# Patient Record
Sex: MALE | Race: WHITE | NOT HISPANIC OR LATINO | Employment: FULL TIME | URBAN - METROPOLITAN AREA
[De-identification: names, ages, dates, MRNs, and addresses within clinical notes are randomized per-mention and may not be internally consistent; named-entity substitution may affect disease eponyms.]

---

## 2021-10-08 PROCEDURE — 96372 THER/PROPH/DIAG INJ SC/IM: CPT

## 2021-10-08 PROCEDURE — 29125 APPL SHORT ARM SPLINT STATIC: CPT | Mod: LT

## 2021-10-08 PROCEDURE — 99284 EMERGENCY DEPT VISIT MOD MDM: CPT | Mod: 25

## 2021-10-09 ENCOUNTER — HOSPITAL ENCOUNTER (EMERGENCY)
Facility: HOSPITAL | Age: 31
Discharge: HOME | End: 2021-10-09
Attending: FAMILY MEDICINE
Payer: MEDICAID

## 2021-10-09 VITALS
RESPIRATION RATE: 18 BRPM | OXYGEN SATURATION: 100 % | WEIGHT: 169.75 LBS | TEMPERATURE: 98 F | DIASTOLIC BLOOD PRESSURE: 79 MMHG | HEART RATE: 86 BPM | SYSTOLIC BLOOD PRESSURE: 151 MMHG

## 2021-10-09 DIAGNOSIS — S63.502A SPRAIN OF LEFT WRIST, INITIAL ENCOUNTER: Primary | ICD-10-CM

## 2021-10-09 DIAGNOSIS — M25.532 LEFT WRIST PAIN: ICD-10-CM

## 2021-10-09 PROCEDURE — 29125 APPL SHORT ARM SPLINT STATIC: CPT | Mod: LT

## 2021-10-09 PROCEDURE — 96372 THER/PROPH/DIAG INJ SC/IM: CPT

## 2021-10-09 PROCEDURE — 63600175 PHARM REV CODE 636 W HCPCS: Performed by: NURSE PRACTITIONER

## 2021-10-09 RX ORDER — IBUPROFEN 800 MG/1
800 TABLET ORAL EVERY 6 HOURS PRN
Qty: 20 TABLET | Refills: 0 | Status: ON HOLD | OUTPATIENT
Start: 2021-10-09 | End: 2022-10-25 | Stop reason: HOSPADM

## 2021-10-09 RX ORDER — HYDROCODONE BITARTRATE AND ACETAMINOPHEN 7.5; 325 MG/1; MG/1
1 TABLET ORAL EVERY 6 HOURS PRN
Qty: 12 TABLET | Refills: 0 | Status: SHIPPED | OUTPATIENT
Start: 2021-10-09 | End: 2021-10-14

## 2021-10-09 RX ORDER — KETOROLAC TROMETHAMINE 30 MG/ML
30 INJECTION, SOLUTION INTRAMUSCULAR; INTRAVENOUS
Status: COMPLETED | OUTPATIENT
Start: 2021-10-09 | End: 2021-10-09

## 2021-10-09 RX ADMIN — KETOROLAC TROMETHAMINE 30 MG: 30 INJECTION, SOLUTION INTRAMUSCULAR at 12:10

## 2021-10-11 ENCOUNTER — TELEPHONE (OUTPATIENT)
Dept: ORTHOPEDICS | Facility: CLINIC | Age: 31
End: 2021-10-11

## 2022-09-10 ENCOUNTER — HOSPITAL ENCOUNTER (EMERGENCY)
Facility: HOSPITAL | Age: 32
Discharge: HOME | End: 2022-09-10
Attending: EMERGENCY MEDICINE
Payer: MEDICAID

## 2022-09-10 VITALS
DIASTOLIC BLOOD PRESSURE: 74 MMHG | HEART RATE: 101 BPM | BODY MASS INDEX: 29.17 KG/M2 | SYSTOLIC BLOOD PRESSURE: 138 MMHG | WEIGHT: 192.44 LBS | RESPIRATION RATE: 20 BRPM | OXYGEN SATURATION: 99 % | TEMPERATURE: 99 F | HEIGHT: 68 IN

## 2022-09-10 DIAGNOSIS — Z76.89 ENCOUNTER FOR MEDICATION ADMINISTRATION: Primary | ICD-10-CM

## 2022-09-10 PROCEDURE — 99283 EMERGENCY DEPT VISIT LOW MDM: CPT

## 2022-09-10 PROCEDURE — 25000003 PHARM REV CODE 250: Performed by: NURSE PRACTITIONER

## 2022-09-10 RX ORDER — METHADONE HYDROCHLORIDE 10 MG/1
80 TABLET ORAL ONCE
Status: COMPLETED | OUTPATIENT
Start: 2022-09-10 | End: 2022-09-10

## 2022-09-10 RX ADMIN — METHADONE HYDROCHLORIDE 80 MG: 10 TABLET ORAL at 06:09

## 2022-09-10 NOTE — ED PROVIDER NOTES
HISTORY     Chief Complaint   Patient presents with    Medication Refill     Needs methadone 80 mg, out of meds and clinic closed before he got there.     Review of patient's allergies indicates:   Allergen Reactions    Sulfa (sulfonamide antibiotics)         HPI   32-year-old male presents the emergency department for medication administration.  Patient reports he takes 80 mg of methadone daily did not make it in time for his appointment today and is seeking dosing here in the ER for his daily dose.  Patient denies any fever, chills, chest pain, back pain, abdominal pain, shortness of breath, nausea, vomiting, and all other concerns at this time.    The history is provided by the patient. No  was used.      PCP: Kristopher Lamas MD     Past Medical History:  No past medical history on file.     Past Surgical History:  No past surgical history on file.     Family History:  No family history on file.     Social History:  Social History     Tobacco Use    Smoking status: Not on file    Smokeless tobacco: Not on file   Substance and Sexual Activity    Alcohol use: Not on file    Drug use: Not on file    Sexual activity: Not on file         ROS   Review of Systems   Constitutional:  Negative for fever.   HENT:  Negative for sore throat.    Respiratory:  Negative for shortness of breath.    Cardiovascular:  Negative for chest pain.   Gastrointestinal:  Negative for abdominal pain and nausea.   Genitourinary:  Negative for dysuria.   Musculoskeletal:  Negative for back pain.   Skin:  Negative for rash.   Neurological:  Negative for weakness.   Hematological:  Does not bruise/bleed easily.     PHYSICAL EXAM     Initial Vitals [09/10/22 1737]   BP Pulse Resp Temp SpO2   (!) 146/77 (!) 113 20 98.8 °F (37.1 °C) 98 %      MAP       --           Physical Exam    Nursing note and vitals reviewed.  Constitutional: He appears well-developed and well-nourished. He is not diaphoretic. No distress.   HENT:  "  Head: Normocephalic and atraumatic.   Eyes: Right eye exhibits no discharge. Left eye exhibits no discharge.   Neck: Neck supple.   Normal range of motion.  Cardiovascular:            Tachycardic   Pulmonary/Chest: No respiratory distress.   Abdominal: Abdomen is soft. He exhibits no distension. There is no abdominal tenderness.   Musculoskeletal:         General: Normal range of motion.      Cervical back: Normal range of motion and neck supple.     Neurological: He is alert and oriented to person, place, and time. He has normal strength.   Skin: Skin is warm and dry.   Psychiatric: He has a normal mood and affect. His behavior is normal. Thought content normal.        ED COURSE   Procedures  ED ONGOING VITALS:  Vitals:    09/10/22 1737   BP: (!) 146/77   Pulse: (!) 113   Resp: 20   Temp: 98.8 °F (37.1 °C)   TempSrc: Oral   SpO2: 98%   Weight: 87.3 kg (192 lb 7.4 oz)   Height: 5' 8" (1.727 m)         ABNORMAL LAB VALUES:  Labs Reviewed - No data to display      ALL LAB VALUES:  none      RADIOLOGY STUDIES:  Imaging Results    None                   The above vital signs and test results have been reviewed by the emergency provider.     ED Medications:  Medications   methadone tablet 80 mg (has no administration in time range)       Current Discharge Medication List        Discharge Medications:  New Prescriptions    No medications on file       Follow-up Information       pcp of choice.    Why: As needed             [facility] - Emergency Dept..    Specialty: Emergency Medicine  Why: If symptoms worsen  Contact information:  [address] [facility] [address] [phone]                          I discussed with patient and/or family/caretaker that evaluation in the ED does not suggest any emergent or life threatening medical conditions requiring immediate intervention beyond what was provided in the ED, and I believe patient is safe for discharge. Regardless, an unremarkable evaluation in " the ED does not preclude the development or presence of a serious or life threatening condition. As such, patient was instructed to return immediately for any worsening or change in current symptoms.        MEDICAL DECISION MAKING   Medical Decision Making:   Emergency Methadone Documentation    Patient Current Dose: 80 mg  Clinic Name: [facility]  Clinic Phone Number: [phone]  Clinic Contact Name: non contact name. Address is [address] [place] [address]  Patient Specific number if applicable: [phone]  Date of last dose: 09/09/2022                 CLINICAL IMPRESSION       ICD-10-CM ICD-9-CM   1. Encounter for medication administration  Z76.89 V58.69       Disposition:   Disposition: Discharged  Condition: Stable       Javier Snider NP  09/10/22 3447

## 2022-09-11 ENCOUNTER — HOSPITAL ENCOUNTER (EMERGENCY)
Facility: HOSPITAL | Age: 32
Discharge: HOME | End: 2022-09-11
Attending: EMERGENCY MEDICINE
Payer: MEDICAID

## 2022-09-11 VITALS
OXYGEN SATURATION: 98 % | HEART RATE: 82 BPM | DIASTOLIC BLOOD PRESSURE: 73 MMHG | TEMPERATURE: 98 F | SYSTOLIC BLOOD PRESSURE: 132 MMHG | RESPIRATION RATE: 18 BRPM

## 2022-09-11 DIAGNOSIS — F11.20 ENCOUNTER FOR METHADONE MAINTENANCE THERAPY: Primary | ICD-10-CM

## 2022-09-11 DIAGNOSIS — Z76.89 ENCOUNTER FOR MEDICATION ADMINISTRATION: ICD-10-CM

## 2022-09-11 PROCEDURE — 99283 EMERGENCY DEPT VISIT LOW MDM: CPT | Mod: 25

## 2022-09-11 PROCEDURE — 25000003 PHARM REV CODE 250: Performed by: NURSE PRACTITIONER

## 2022-09-11 RX ORDER — METHADONE HYDROCHLORIDE 10 MG/1
80 TABLET ORAL
Status: COMPLETED | OUTPATIENT
Start: 2022-09-11 | End: 2022-09-11

## 2022-09-11 RX ADMIN — METHADONE HYDROCHLORIDE 80 MG: 10 TABLET ORAL at 11:09

## 2022-09-13 NOTE — ED PROVIDER NOTES
Encounter Date: 9/11/2022       History     Chief Complaint   Patient presents with    Medication Refill     Pt is here for a methadone refill. His normal clinic closed before he could make it today.      32-year-old male presents to the ER for a methadone dose.  Methadone clinic is not open on Sunday.  Patient usually gets his methadone for Sunday on Saturday but he missed his appointment on Saturday.    The history is provided by the patient.   Review of patient's allergies indicates:   Allergen Reactions    Sulfa (sulfonamide antibiotics)      No past medical history on file.  No past surgical history on file.  No family history on file.     Review of Systems   Constitutional:  Negative for fever.        Methadone administration   HENT:  Negative for sore throat.    Respiratory:  Negative for shortness of breath.    Cardiovascular:  Negative for chest pain.   Gastrointestinal:  Negative for nausea.   Genitourinary:  Negative for dysuria.   Musculoskeletal:  Negative for back pain.   Skin:  Negative for rash.   Neurological:  Negative for weakness.   Hematological:  Does not bruise/bleed easily.     Physical Exam     Initial Vitals [09/11/22 2253]   BP Pulse Resp Temp SpO2   132/73 82 18 98.1 °F (36.7 °C) 98 %      MAP       --         Physical Exam    Constitutional: He appears well-developed and well-nourished. No distress.   HENT:   Head: Normocephalic and atraumatic.   Eyes: Conjunctivae are normal. Pupils are equal, round, and reactive to light.   Neck: Neck supple.   Normal range of motion.  Cardiovascular:  Normal rate, regular rhythm and normal heart sounds.           Pulmonary/Chest: Breath sounds normal.   Abdominal: Abdomen is soft. Bowel sounds are normal.   Musculoskeletal:         General: Normal range of motion.      Cervical back: Normal range of motion and neck supple.     Neurological: He is alert and oriented to person, place, and time. No cranial nerve deficit.   Skin: Skin is warm and dry.    Psychiatric: He has a normal mood and affect.       ED Course   Procedures  Labs Reviewed - No data to display       Imaging Results    None          Medications   methadone tablet 80 mg (80 mg Oral Given 9/11/22 7180)                              Clinical Impression:   Final diagnoses:  [F11.20] Encounter for methadone maintenance therapy (Primary)  [Z76.89] Encounter for medication administration        ED Disposition Condition    Discharge Stable          ED Prescriptions    None       Follow-up Information       Follow up With Specialties Details Why Contact Info    Kristopher Lamas MD Internal Medicine   [address] [place] [id number] [phone]      [facility] - Emergency Dept. Emergency Medicine   [address] [facility] [address] [phone]             Jasson Garcia NP  09/13/22 0147

## 2022-10-15 ENCOUNTER — HOSPITAL ENCOUNTER (EMERGENCY)
Facility: HOSPITAL | Age: 32
Discharge: HOME | End: 2022-10-15
Attending: EMERGENCY MEDICINE
Payer: MEDICAID

## 2022-10-15 VITALS
RESPIRATION RATE: 20 BRPM | SYSTOLIC BLOOD PRESSURE: 144 MMHG | OXYGEN SATURATION: 100 % | HEART RATE: 92 BPM | TEMPERATURE: 99 F | DIASTOLIC BLOOD PRESSURE: 76 MMHG

## 2022-10-15 DIAGNOSIS — S99.929A FOOT INJURY: ICD-10-CM

## 2022-10-15 DIAGNOSIS — S82.892A CLOSED FRACTURE OF LEFT ANKLE, INITIAL ENCOUNTER: Primary | ICD-10-CM

## 2022-10-15 DIAGNOSIS — M79.89 LEG SWELLING: ICD-10-CM

## 2022-10-15 DIAGNOSIS — S99.919A ANKLE INJURY: ICD-10-CM

## 2022-10-15 DIAGNOSIS — S82.852A LEFT TRIMALLEOLAR FRACTURE, CLOSED, INITIAL ENCOUNTER: Primary | ICD-10-CM

## 2022-10-15 PROCEDURE — 99283 EMERGENCY DEPT VISIT LOW MDM: CPT | Mod: 57,,, | Performed by: ORTHOPAEDIC SURGERY

## 2022-10-15 PROCEDURE — 27818 ORTHOPEDIC INJURY TREATMENT: ICD-10-PCS | Mod: LT,,, | Performed by: ORTHOPAEDIC SURGERY

## 2022-10-15 PROCEDURE — 29515 APPLICATION SHORT LEG SPLINT: CPT | Mod: LT

## 2022-10-15 PROCEDURE — 99283 EMERGENCY DEPT VISIT LOW MDM: CPT | Mod: 25

## 2022-10-15 PROCEDURE — 99283 PR EMERGENCY DEPT VISIT,LEVEL III: ICD-10-PCS | Mod: 57,,, | Performed by: ORTHOPAEDIC SURGERY

## 2022-10-15 PROCEDURE — 27818 TREATMENT OF ANKLE FRACTURE: CPT | Mod: LT,,, | Performed by: ORTHOPAEDIC SURGERY

## 2022-10-15 RX ORDER — HYDROCODONE BITARTRATE AND ACETAMINOPHEN 10; 325 MG/1; MG/1
1 TABLET ORAL EVERY 4 HOURS PRN
Qty: 10 TABLET | Refills: 0 | Status: ON HOLD | OUTPATIENT
Start: 2022-10-15 | End: 2022-10-25 | Stop reason: HOSPADM

## 2022-10-16 PROBLEM — S82.892A CLOSED FRACTURE OF LEFT ANKLE: Status: ACTIVE | Noted: 2022-10-16

## 2022-10-16 NOTE — ED PROVIDER NOTES
Encounter Date: 10/15/2022       History     Chief Complaint   Patient presents with    Foot Injury     Pt states he fell awkwardly at his moms house on a wet floor and thinks his left foot is broken. Pt foot is purple and extremely bruised in triage. Pt is on crutches in triage.      Patient complains of left ankle pain states that last night around midnight he fell awkwardly twisting his left ankle.    The history is provided by the patient.   Foot Injury   The incident occurred at home. The injury mechanism was a fall. The incident occurred today. He has tried nothing for the symptoms.   Review of patient's allergies indicates:   Allergen Reactions    Sulfa (sulfonamide antibiotics)      No past medical history on file.  No past surgical history on file.  No family history on file.     Review of Systems   Constitutional:  Negative for fever.   HENT:  Negative for sore throat.    Respiratory:  Negative for shortness of breath.    Cardiovascular:  Negative for chest pain.   Gastrointestinal:  Negative for nausea.   Genitourinary:  Negative for dysuria.   Musculoskeletal:  Positive for arthralgias and joint swelling. Negative for back pain.   Skin:  Negative for rash.   Neurological:  Negative for weakness.   Hematological:  Does not bruise/bleed easily.     Physical Exam     Initial Vitals [10/15/22 1911]   BP Pulse Resp Temp SpO2   (!) 160/78 98 19 98.9 °F (37.2 °C) 98 %      MAP       --         Physical Exam    Nursing note and vitals reviewed.  Constitutional: He appears well-developed and well-nourished.   HENT:   Head: Normocephalic and atraumatic.   Eyes: Conjunctivae are normal. Pupils are equal, round, and reactive to light.   Neck: Neck supple.   Normal range of motion.  Cardiovascular:  Normal rate, regular rhythm, normal heart sounds and intact distal pulses.           Pulmonary/Chest: Breath sounds normal.   Abdominal: Abdomen is soft. There is no rebound and no guarding.   Musculoskeletal:          General: Normal range of motion.      Cervical back: Normal range of motion and neck supple.      Comments: Significant left ankle ecchymosis with swelling TTP with range of motion and medial malleolar to bulla lesions     Neurological: He is alert.   Skin: Skin is warm and dry.   Psychiatric: He has a normal mood and affect. His behavior is normal. Thought content normal.       ED Course   Procedures  Labs Reviewed - No data to display       Imaging Results               CT Ankle (Including Hindfoot) Without Contrast Left (Final result)  Result time 10/15/22 22:34:03      Final result by Connor Funez MD (10/15/22 22:34:03)                   Impression:      Trimalleolar fracture as above.    This report was flagged in Epic as abnormal.    All CT scans at this facility are performed  using dose modulation techniques as appropriate to performed exam including the following:  automated exposure control; adjustment of mA and/or kV according to the patients size (this includes techniques or standardized protocols for targeted exams where dose is matched to indication/reason for exam: i.e. extremities or head);  iterative reconstruction technique.      Electronically signed by: Connor Funez  Date:    10/15/2022  Time:    22:34               Narrative:    EXAMINATION:  CT ANKLE (INCLUDING HINDFOOT) WITHOUT CONTRAST LEFT    CLINICAL HISTORY:  Fracture, ankle;Orthopedic request for surgery planning;    TECHNIQUE:  Low-dose axial noncontrast images of the ankle were obtained.  Post processing with multiplanar reformats.    COMPARISON:  None    FINDINGS:  Acute displaced medial malleolus fracture.  Minimal angulation.    Obliquely oriented fibular fracture which is mildly displaced and mildly angulated.    Additional posterior tibial fracture, minimally displaced and non angulated.    Associated soft tissue swelling throughout the visualized lower extremity likely posttraumatic.    Type 2 os navicular.  No osseous  destructive process.                                        X-Ray Tibia Fibula 2 View Left (Final result)  Result time 10/15/22 22:18:15      Final result by Connor Funez MD (10/15/22 22:18:15)                   Impression:      As above.    This report was flagged in Epic as abnormal.      Electronically signed by: Connor Funez  Date:    10/15/2022  Time:    22:18               Narrative:    EXAMINATION:  XR TIBIA FIBULA 2 VIEW LEFT    CLINICAL HISTORY:  Other specified soft tissue disorders    TECHNIQUE:  AP and lateral views of the left tibia and fibula were performed.    COMPARISON:  None.    FINDINGS:  Soft tissue swelling possibly posttraumatic.    Redemonstration of trimalleolar fracture.                                       X-Ray Foot Complete Left (Final result)  Result time 10/15/22 22:19:11      Final result by Connor Funez MD (10/15/22 22:19:11)                   Impression:      As above.      Electronically signed by: Connor Funez  Date:    10/15/2022  Time:    22:19               Narrative:    EXAMINATION:  XR FOOT COMPLETE 3 VIEW LEFT    CLINICAL HISTORY:  .  Unspecified injury of unspecified foot, initial encounter    TECHNIQUE:  AP, lateral and oblique views of the left foot were performed.    COMPARISON:  None    FINDINGS:  Redemonstration of trimalleolar fracture.  Fracture alignment appears similar to the prior.    No additional fracture of the foot identified.  Type 2 os navicular.                                       X-Ray Ankle Complete Left (Final result)  Result time 10/15/22 19:50:01      Final result by Connor Funez MD (10/15/22 19:50:01)                   Impression:      Trimalleolar fracture as above with associated soft tissue swelling.      Electronically signed by: Connor Funez  Date:    10/15/2022  Time:    19:50               Narrative:    EXAMINATION:  XR ANKLE COMPLETE 3 VIEW LEFT    CLINICAL HISTORY:  Unspecified injury of unspecified ankle, initial  encounter.  No additional clinical history available the time of dictation.    TECHNIQUE:  AP, lateral and oblique views of the left ankle were performed.    COMPARISON:  None    FINDINGS:  Acute mildly displaced medial malleolus fracture with associated soft tissue swelling.  Obliquely oriented mildly displaced fibular fracture.  Posterior tibial fracture also noted, mildly displaced.                                       Medications - No data to display  Medical Decision Making:   Orthopedic on-call Dr. Cano in the ED to reduce and splint the patient              10:00 PM:  Assumed care patient.  Currently awaiting CT imaging for further evaluation of trimalleolar fracture.  Patient currently resting and pain controlled at this time.     10:46 PM: Dr. Cano reviewed CT images of ankle and states that patient is fine to be discharged.  Patient will likely have surgery on October 25th or October 28th at [facility].         Clinical Impression:   Final diagnoses:  [S99.919A] Ankle injury  [S99.929A] Foot injury  [M79.89] Leg swelling  [S82.892A] Closed fracture of left ankle, initial encounter (Primary)        ED Disposition Condition    Discharge Stable          ED Prescriptions       Medication Sig Dispense Start Date End Date Auth. Provider    HYDROcodone-acetaminophen (NORCO)  mg per tablet Take 1 tablet by mouth every 4 (four) hours as needed for Pain. 10 tablet 10/15/2022 -- Demetrius Zhang NP          Follow-up Information       Follow up With Specialties Details Why Contact Info    [facility]    [address] [place] [address] [address]  [place] [address]  [phone]               Michael Fisher Jr., FNP  10/16/22 0217

## 2022-10-16 NOTE — PROCEDURES
Hudson Alex is a 32 y.o. male patient.    Temp: 98.9 °F (37.2 °C) (10/15/22 2302)  Pulse: 92 (10/15/22 2302)  Resp: 20 (10/15/22 2302)  BP: (!) 144/76 (10/15/22 2302)  SpO2: 100 % (10/15/22 2302)       Orthopedic Injury Treatment    Date/Time: 10/15/2022 10:00 PM  Performed by: Hernando Cano MD  Authorized by: Hernando Cano MD     Location procedure was performed:  [facility]  Pre-operative diagnosis:  Left trimalleolar ankle fracture  Post-operative diagnosis:  Left trimalleolar ankle fracture  Consent Done?:  Not Needed  Injury:     Injury location:  Ankle    Injury type:  Fracture    Fracture type: trimalleolar        Pre-procedure assessment:     Neurovascular status: Neurovascularly intact      Distal perfusion: normal      Neurological function: normal      Range of motion: reduced      Local anesthesia used?: No      Patient sedated?: No        Procedure details:     Description of findings:  Trimal fx  Selections made in this section will also lock the Injury type section above.:     Manipulation performed?: Yes      Skin traction used?: No      Skeletal traction used?: No      Reduction successful?: Yes      Confirmation: Reduction confirmed by x-ray      Immobilization:  Splint    Supplies used:  Ortho-Glass    Technical Procedures Used:  3 point molded splint    Complications: No      Estimated blood loss (mL):  0    Specimens: No      Implants: No    Post-procedure assessment:     Neurovascular status: Neurovascularly intact      Distal perfusion: normal      Neurological function: normal      Range of motion: splinted      Patient tolerance:  Patient tolerated the procedure well with no immediate complications    10/16/2022

## 2022-10-16 NOTE — CONSULTS
CC:  Left ankle fracture      HISTORY       HPI:  32-year-old male fall down 1 step 10/14/2022  Immediate ankle pain, inability bear weight.  Following the injury he had to drive himself to the emergency department, but left an outside hospital facility due to prolonged wait, then came to our facility.  He presented our facility, it was noted to have significant left ankle and foot swelling, with fracture blisters around his ankle.  X-rays indicated left trimalleolar ankle fracture.    Lives at home with mother   Works construction   Chews tobacco   Denies history of heart attack, stroke, blood clot, cancer, diabetes    At the time my evaluation patient isolated pain left ankle, 7/10, sharp, stabbing.  No numbness no tingling    ROS:  Constitutional: Denies fever/chills  Neurological: Denies numbness/tingling (any exceptions noted in orthopaedic exam)   Psychiatric/Behavioral: Denies change in normal mood  Eyes: Denies change in vision  Cardiovascular: Denies chest pain  Respiratory: Denies shortness of breath  Hematologic/Lymphatic: Denies easy bleeding/bruising   Skin: Denies new rash or skin lesions   Gastrointestinal: Denies nausea/vomitting/diarrhea, change in bowel habits, abdominal pain   Allergic/Immunologic: Denies adverse reactions to current medications  Musculoskeletal: see HPI    PAST MEDICAL HISTORY: No past medical history on file.  PAST SURGICAL HISTORY: No past surgical history on file.  FAMILY HISTORY: No family history on file.  SOCIAL HISTORY:   Social History     Socioeconomic History    Marital status: Single     MEDICATIONS: No current facility-administered medications for this encounter.    Current Outpatient Medications:     HYDROcodone-acetaminophen (NORCO)  mg per tablet, Take 1 tablet by mouth every 4 (four) hours as needed for Pain., Disp: 10 tablet, Rfl: 0    ibuprofen (ADVIL,MOTRIN) 800 MG tablet, Take 1 tablet (800 mg total) by mouth every 6 (six) hours as needed for Pain.,  Disp: 20 tablet, Rfl: 0  ALLERGIES:   Review of patient's allergies indicates:   Allergen Reactions    Sulfa (sulfonamide antibiotics)          EXAM      VITAL SIGNS:   BP (!) 160/78 (BP Location: Right arm, Patient Position: Sitting)   Pulse 98   Temp 98.9 °F (37.2 °C) (Oral)   Resp 19   SpO2 98%       PE:  General:  no acute distress, appears stated age    Neuro: alert and oriented x3  Psych: normal mood  Head: normocephalic, atraumatic.   Eyes: no scleral icterus  Mouth: moist mucous membranes  Cardiovascular: extremities warm and well perfused  Lungs: breathing comfortably, equal chest rise bilat  Skin: clean, dry, intact (any exceptions noted in below musculoskeletal exam)    Musculoskeletal:  RLE:  No gross deformities, wounds  No crepitus, No TTP  Motor intact hip flex, quad, Tib Ant, gastroc, EHL, FHL.  Sensation intact saphenous, sural, deep/superficial peroneal, tibial nerves  Palp DP/PT pulse, BCR    LLE:  Significant swelling about ankle   Blood-filled fracture blisters medially   Tender to palpation circumferentially about ankle  No proximal leg or knee tenderness  No hip pain  Motor intact hip flex, quad, Tib Ant, gastroc, EHL, FHL.  Sensation intact saphenous, sural, deep/superficial peroneal, tibial nerves  Palp DP/PT pulse, BCR    XRAYS:  X-ray and CT scan left ankle demonstrate a trimalleolar ankle fracture mild displacement  (I independently reviewed and interpreted the above imaging)    MEDICAL DECISION MAKING       32M  Fall 10.14.22  L trimal ankle fx    Significant soft tissue swelling and blood filled fx blisters    Molded splint applied LLE  Discussed injury  Discussed nonop vs ORIF    F/u in clinic NPO  after MN 10.25 in AM (JUAN Tirado) for removal of splint, skin check, possible surgery that day if soft tissue of amenable - however will likely require further soft tissue rest     Rec ORIF when soft tissue swelling allows (10.25 or 10.28 @  [facility])    =====================  Hernando Cano MD  Orthopaedic Surgery

## 2022-10-17 ENCOUNTER — HOSPITAL ENCOUNTER (EMERGENCY)
Facility: HOSPITAL | Age: 32
Discharge: HOME | End: 2022-10-17
Attending: EMERGENCY MEDICINE
Payer: MEDICAID

## 2022-10-17 VITALS
HEART RATE: 110 BPM | WEIGHT: 206.81 LBS | RESPIRATION RATE: 20 BRPM | DIASTOLIC BLOOD PRESSURE: 80 MMHG | HEIGHT: 68 IN | SYSTOLIC BLOOD PRESSURE: 144 MMHG | BODY MASS INDEX: 31.34 KG/M2 | OXYGEN SATURATION: 100 % | TEMPERATURE: 99 F

## 2022-10-17 DIAGNOSIS — S82.892D CLOSED FRACTURE OF LEFT ANKLE WITH ROUTINE HEALING, SUBSEQUENT ENCOUNTER: ICD-10-CM

## 2022-10-17 DIAGNOSIS — Z47.89 AFTERCARE FOR CAST OR SPLINT CHECK OR CHANGE: Primary | ICD-10-CM

## 2022-10-17 PROCEDURE — 99283 EMERGENCY DEPT VISIT LOW MDM: CPT | Mod: 25

## 2022-10-17 PROCEDURE — 29515 APPLICATION SHORT LEG SPLINT: CPT

## 2022-10-18 NOTE — ED NOTES
Bed: Dispo 2  Expected date:   Expected time:   Means of arrival:   Comments:  Isai Dunn NP  10/17/22 2127

## 2022-10-18 NOTE — ED PROVIDER NOTES
Encounter Date: 10/17/2022       History     Chief Complaint   Patient presents with    Foot Pain      Pt reports that he was seen in ED yesterday and was diagnosed with 3 broken bones in left foot. Pt reports that his splint popped open and now he is experiencing bleeding and needs his foot re wrapped with a splint.      Patient presents to ER for splint re-application.  Patient was evaluated 2 days ago after experiencing left foot injury and diagnosed with a trimalleolar fracture and placed in a posterior and stirrup splint to left lower extremity.  Patient states he was walking with his crutches and a nail sticking out of the ground caught the bottom of his splint tearing the splint.  He states the splint has been progressively ripping ever since.  He states the nail did not make contact with his skin or cause puncture wound.  Patient is here in ER to have splint replaced.  He has no further concerns at this time.  He denies numbness, tingling, fever.    The history is provided by the patient.   Review of patient's allergies indicates:   Allergen Reactions    Sulfa (sulfonamide antibiotics)      No past medical history on file.  No past surgical history on file.  No family history on file.     Review of Systems   Constitutional:  Negative for chills, fatigue and fever.   HENT:  Negative for ear pain and sore throat.    Eyes:  Negative for pain.   Respiratory:  Negative for cough and shortness of breath.    Cardiovascular:  Negative for chest pain and palpitations.   Gastrointestinal:  Negative for abdominal pain, nausea and vomiting.   Genitourinary:  Negative for dysuria.   Musculoskeletal:  Negative for back pain, myalgias and neck pain.        + splint to LLE   Skin:  Negative for rash.   Neurological:  Negative for weakness, numbness and headaches.     Physical Exam     Initial Vitals [10/17/22 2041]   BP Pulse Resp Temp SpO2   (!) 156/90 (!) 116 20 98.2 °F (36.8 °C) 98 %      MAP       --         Physical  Exam    Nursing note and vitals reviewed.  Constitutional: He appears well-developed and well-nourished. He is not diaphoretic. No distress.   HENT:   Head: Normocephalic and atraumatic.   Eyes: EOM are normal. Pupils are equal, round, and reactive to light.   Neck: Neck supple.   Normal range of motion.  Cardiovascular:  Regular rhythm and intact distal pulses.           + tachycardia   Pulmonary/Chest: Breath sounds normal. No respiratory distress.   Musculoskeletal:         General: Normal range of motion.      Cervical back: Normal range of motion and neck supple.     Neurological: He is alert and oriented to person, place, and time. He has normal strength. No sensory deficit. GCS score is 15. GCS eye subscore is 4. GCS verbal subscore is 5. GCS motor subscore is 6.   Skin: Skin is warm and dry. Capillary refill takes less than 2 seconds.   + after fully removing posterior and stirrup splint to left lower extremity, ecchymosis to left foot and left ankle noted.  There is blistering noted to left ankle.  Distal sensation is intact.  Neurovascularly intact.       ED Course   Splint Application    Date/Time: 10/17/2022 9:26 PM  Performed by: Rafat Dunn NP  Authorized by: Sachin Parmar Jr., MD   Location details: left ankle  Splint type: short leg (Short-leg posterior and stirrup splint)  Supplies used: Ortho-Glass  Post-procedure: The splinted body part was neurovascularly unchanged following the procedure.  Patient tolerance: Patient tolerated the procedure well with no immediate complications  Comments: Patient remains neurovascularly intact.      Labs Reviewed - No data to display       Imaging Results    None          Medications - No data to display               Patient remains neurovascularly intact post splint application.  Patient with no further concerns.  Patient states he does have orthopedic follow-up, encouraged patient to continue ortho follow-up as scheduled.  Discussed signs and symptoms to  return to ER.  Patient agrees with plan states comfortable discharge home.    I discussed with patient  that evaluation in the ED does not suggest any emergent or life threatening medical conditions requiring immediate intervention beyond what was provided in the ED, and I believe patient is safe for discharge. Regardless, an unremarkable evaluation in the ED does not preclude the development or presence of a serious of life threatening condition. As such, patient was instructed to return immediately for any worsening or change in current symptoms.              Clinical Impression:   Final diagnoses:  [S82.442D] Closed fracture of left ankle with routine healing, subsequent encounter  [Z47.89] Aftercare for cast or splint check or change (Primary)      ED Disposition Condition    Discharge Stable          ED Prescriptions    None       Follow-up Information       Follow up With Specialties Details Why Contact Info    [facility]  In 1 day  [address]  [facility] [address]  [phone]      [facility] Dept. Emergency Medicine  As needed, If symptoms worsen [address] [facility] [address] [phone]             Rafat Dunn NP  10/17/22 0561

## 2022-10-22 ENCOUNTER — HOSPITAL ENCOUNTER (EMERGENCY)
Facility: HOSPITAL | Age: 32
Discharge: HOME | End: 2022-10-22
Attending: EMERGENCY MEDICINE
Payer: MEDICAID

## 2022-10-22 ENCOUNTER — NURSE TRIAGE (OUTPATIENT)
Dept: ADMINISTRATIVE | Facility: CLINIC | Age: 32
End: 2022-10-22
Payer: MEDICAID

## 2022-10-22 VITALS
SYSTOLIC BLOOD PRESSURE: 149 MMHG | TEMPERATURE: 98 F | OXYGEN SATURATION: 99 % | BODY MASS INDEX: 29.9 KG/M2 | HEART RATE: 102 BPM | HEIGHT: 68 IN | DIASTOLIC BLOOD PRESSURE: 77 MMHG | RESPIRATION RATE: 20 BRPM | WEIGHT: 197.31 LBS

## 2022-10-22 DIAGNOSIS — Z47.89 AFTERCARE FOR CAST OR SPLINT CHECK OR CHANGE: Primary | ICD-10-CM

## 2022-10-22 PROCEDURE — 99283 EMERGENCY DEPT VISIT LOW MDM: CPT | Mod: 25,27

## 2022-10-22 PROCEDURE — 25000003 PHARM REV CODE 250: Performed by: NURSE PRACTITIONER

## 2022-10-22 PROCEDURE — 29515 APPLICATION SHORT LEG SPLINT: CPT | Mod: LT

## 2022-10-22 RX ORDER — MUPIROCIN 20 MG/G
1 OINTMENT TOPICAL
Status: COMPLETED | OUTPATIENT
Start: 2022-10-22 | End: 2022-10-22

## 2022-10-22 RX ORDER — ONDANSETRON 4 MG/1
4 TABLET, ORALLY DISINTEGRATING ORAL EVERY 6 HOURS PRN
Qty: 20 TABLET | Refills: 0 | Status: SHIPPED | OUTPATIENT
Start: 2022-10-22

## 2022-10-22 RX ADMIN — MUPIROCIN 22 G: 20 OINTMENT TOPICAL at 04:10

## 2022-10-22 NOTE — ED PROVIDER NOTES
Encounter Date: 10/22/2022       History     Chief Complaint   Patient presents with    needs new splint     States he has fx to left ankle and had splint placed. Splint came loose and was removed, had another splint placed but it got wet. Now needs a 3rd splint.     Pt presents for reapplication of splint for left ankle fracture.  Pt was seen in the ER last week for initial visit and then had another splint placed a few days ago after the first was falling off.  He states this one got wet so he took it off.  He reports ortho appt in [place] next week for ortho eval.      Review of patient's allergies indicates:   Allergen Reactions    Sulfa (sulfonamide antibiotics)      No past medical history on file.  No past surgical history on file.  No family history on file.     Review of Systems   Constitutional:  Negative for fever.   HENT:  Negative for sore throat.    Respiratory:  Negative for shortness of breath.    Cardiovascular:  Negative for chest pain.   Gastrointestinal:  Negative for nausea.   Genitourinary:  Negative for dysuria.   Musculoskeletal:  Positive for arthralgias (left ankle). Negative for back pain.   Skin:  Negative for rash.   Neurological:  Negative for weakness.   Hematological:  Does not bruise/bleed easily.     Physical Exam     Initial Vitals [10/22/22 1521]   BP Pulse Resp Temp SpO2   (!) 149/77 102 20 97.9 °F (36.6 °C) 99 %      MAP       --         Physical Exam    Nursing note and vitals reviewed.  Constitutional: He appears well-developed and well-nourished.   HENT:   Head: Normocephalic and atraumatic.   Eyes: Conjunctivae and EOM are normal. Pupils are equal, round, and reactive to light.   Neck: Neck supple.   Normal range of motion.  Cardiovascular:  Normal rate, regular rhythm, normal heart sounds and intact distal pulses.           Pulmonary/Chest: Breath sounds normal.   Abdominal: Abdomen is soft. Bowel sounds are normal.   Musculoskeletal:      Cervical back: Normal range of  motion and neck supple.      Comments: Tenderness and erythema noted to the left ankle.     Neurological: He is alert and oriented to person, place, and time. He has normal strength and normal reflexes.   Skin: Skin is warm and dry. Capillary refill takes less than 2 seconds.   Open blisters noted to the left medial ankle.   Psychiatric: He has a normal mood and affect. His behavior is normal. Judgment and thought content normal.       ED Course   Procedures  Labs Reviewed - No data to display       Imaging Results    None          Medications   mupirocin 2 % ointment 22 g (22 g Topical (Top) Given 10/22/22 4246)     Medical Decision Making:   ED Management:  Posterior short leg and stirrup splint applied.  Pt instructed to follow up with ortho at appt next week.                        Clinical Impression:   Final diagnoses:  [Z47.89] Aftercare for cast or splint check or change (Primary)      ED Disposition Condition    Discharge Stable          ED Prescriptions    None       Follow-up Information    None          Pancho Bhatia NP  10/22/22 8973

## 2022-10-22 NOTE — TELEPHONE ENCOUNTER
Pt asking questions about if cast placement is correct. Unable to advise. Pt currently in ED.       Reason for Disposition   Patient already left for the hospital/clinic.    Protocols used: No Contact or Duplicate Contact Call-A-

## 2022-10-25 ENCOUNTER — ANESTHESIA EVENT (OUTPATIENT)
Dept: SURGERY | Facility: HOSPITAL | Age: 32
End: 2022-10-25
Payer: MEDICAID

## 2022-10-25 ENCOUNTER — OFFICE VISIT (OUTPATIENT)
Dept: ORTHOPEDICS | Facility: CLINIC | Age: 32
End: 2022-10-25
Payer: MEDICAID

## 2022-10-25 ENCOUNTER — ANESTHESIA (OUTPATIENT)
Dept: SURGERY | Facility: HOSPITAL | Age: 32
End: 2022-10-25
Payer: MEDICAID

## 2022-10-25 ENCOUNTER — HOSPITAL ENCOUNTER (OUTPATIENT)
Facility: HOSPITAL | Age: 32
Discharge: HOME | End: 2022-10-25
Attending: ORTHOPAEDIC SURGERY | Admitting: ORTHOPAEDIC SURGERY
Payer: MEDICAID

## 2022-10-25 ENCOUNTER — HOSPITAL ENCOUNTER (OUTPATIENT)
Dept: RADIOLOGY | Facility: HOSPITAL | Age: 32
Discharge: HOME | End: 2022-10-25
Attending: ORTHOPAEDIC SURGERY
Payer: MEDICAID

## 2022-10-25 VITALS
RESPIRATION RATE: 13 BRPM | HEIGHT: 68 IN | DIASTOLIC BLOOD PRESSURE: 85 MMHG | SYSTOLIC BLOOD PRESSURE: 136 MMHG | WEIGHT: 188 LBS | TEMPERATURE: 98 F | HEART RATE: 73 BPM | OXYGEN SATURATION: 95 % | BODY MASS INDEX: 28.49 KG/M2

## 2022-10-25 DIAGNOSIS — S82.852A LEFT TRIMALLEOLAR FRACTURE, CLOSED, INITIAL ENCOUNTER: Primary | ICD-10-CM

## 2022-10-25 DIAGNOSIS — S82.852A LEFT TRIMALLEOLAR FRACTURE, CLOSED, INITIAL ENCOUNTER: ICD-10-CM

## 2022-10-25 PROBLEM — D69.6 THROMBOCYTOPENIA: Status: ACTIVE | Noted: 2017-01-02

## 2022-10-25 PROBLEM — M79.2 NEURALGIA: Status: ACTIVE | Noted: 2020-05-14

## 2022-10-25 PROBLEM — E55.9 VITAMIN D DEFICIENCY: Status: ACTIVE | Noted: 2017-12-20

## 2022-10-25 PROCEDURE — 99999 PR PBB SHADOW E&M-EST. PATIENT-LVL II: ICD-10-PCS | Mod: PBBFAC,,, | Performed by: NURSE PRACTITIONER

## 2022-10-25 PROCEDURE — 63600175 PHARM REV CODE 636 W HCPCS: Performed by: REGISTERED NURSE

## 2022-10-25 PROCEDURE — 73610 X-RAY EXAM OF ANKLE: CPT | Mod: 26,LT,, | Performed by: RADIOLOGY

## 2022-10-25 PROCEDURE — 1159F PR MEDICATION LIST DOCUMENTED IN MEDICAL RECORD: ICD-10-PCS | Mod: CPTII,,, | Performed by: NURSE PRACTITIONER

## 2022-10-25 PROCEDURE — 99212 OFFICE O/P EST SF 10 MIN: CPT | Mod: PBBFAC,25 | Performed by: NURSE PRACTITIONER

## 2022-10-25 PROCEDURE — 36000708 HC OR TIME LEV III 1ST 15 MIN: Performed by: ORTHOPAEDIC SURGERY

## 2022-10-25 PROCEDURE — 76942: ICD-10-PCS | Mod: 26,,, | Performed by: STUDENT IN AN ORGANIZED HEALTH CARE EDUCATION/TRAINING PROGRAM

## 2022-10-25 PROCEDURE — 71000033 HC RECOVERY, INTIAL HOUR: Performed by: ORTHOPAEDIC SURGERY

## 2022-10-25 PROCEDURE — 64445: ICD-10-PCS | Mod: 59,LT,, | Performed by: STUDENT IN AN ORGANIZED HEALTH CARE EDUCATION/TRAINING PROGRAM

## 2022-10-25 PROCEDURE — C1713 ANCHOR/SCREW BN/BN,TIS/BN: HCPCS | Performed by: ORTHOPAEDIC SURGERY

## 2022-10-25 PROCEDURE — 37000009 HC ANESTHESIA EA ADD 15 MINS: Performed by: ORTHOPAEDIC SURGERY

## 2022-10-25 PROCEDURE — 27823 TREATMENT OF ANKLE FRACTURE: CPT | Mod: 58,LT,, | Performed by: ORTHOPAEDIC SURGERY

## 2022-10-25 PROCEDURE — 99999 PR PBB SHADOW E&M-EST. PATIENT-LVL II: CPT | Mod: PBBFAC,,, | Performed by: NURSE PRACTITIONER

## 2022-10-25 PROCEDURE — 27823 PR OPEN TX TRIMALLEOLAR ANKLE FX W FIX PST LIP: ICD-10-PCS | Mod: 58,LT,, | Performed by: ORTHOPAEDIC SURGERY

## 2022-10-25 PROCEDURE — 25000003 PHARM REV CODE 250: Performed by: ORTHOPAEDIC SURGERY

## 2022-10-25 PROCEDURE — 25000003 PHARM REV CODE 250: Performed by: NURSE PRACTITIONER

## 2022-10-25 PROCEDURE — D9220A PRA ANESTHESIA: Mod: ANES,,, | Performed by: ANESTHESIOLOGY

## 2022-10-25 PROCEDURE — 73610 X-RAY EXAM OF ANKLE: CPT | Mod: TC,LT

## 2022-10-25 PROCEDURE — 76942 ECHO GUIDE FOR BIOPSY: CPT | Performed by: STUDENT IN AN ORGANIZED HEALTH CARE EDUCATION/TRAINING PROGRAM

## 2022-10-25 PROCEDURE — 64445 NJX AA&/STRD SCIATIC NRV IMG: CPT | Mod: 59,LT,, | Performed by: STUDENT IN AN ORGANIZED HEALTH CARE EDUCATION/TRAINING PROGRAM

## 2022-10-25 PROCEDURE — 37000008 HC ANESTHESIA 1ST 15 MINUTES: Performed by: ORTHOPAEDIC SURGERY

## 2022-10-25 PROCEDURE — 64447: ICD-10-PCS | Mod: 59,LT,, | Performed by: STUDENT IN AN ORGANIZED HEALTH CARE EDUCATION/TRAINING PROGRAM

## 2022-10-25 PROCEDURE — 1159F MED LIST DOCD IN RCRD: CPT | Mod: CPTII,,, | Performed by: NURSE PRACTITIONER

## 2022-10-25 PROCEDURE — 27201423 OPTIME MED/SURG SUP & DEVICES STERILE SUPPLY: Performed by: ORTHOPAEDIC SURGERY

## 2022-10-25 PROCEDURE — 36000709 HC OR TIME LEV III EA ADD 15 MIN: Performed by: ORTHOPAEDIC SURGERY

## 2022-10-25 PROCEDURE — 63600175 PHARM REV CODE 636 W HCPCS: Performed by: STUDENT IN AN ORGANIZED HEALTH CARE EDUCATION/TRAINING PROGRAM

## 2022-10-25 PROCEDURE — 1160F PR REVIEW ALL MEDS BY PRESCRIBER/CLIN PHARMACIST DOCUMENTED: ICD-10-PCS | Mod: CPTII,,, | Performed by: NURSE PRACTITIONER

## 2022-10-25 PROCEDURE — C1769 GUIDE WIRE: HCPCS | Performed by: ORTHOPAEDIC SURGERY

## 2022-10-25 PROCEDURE — 01480 ANES OPEN PX LOWER L/A/F NOS: CPT | Performed by: ORTHOPAEDIC SURGERY

## 2022-10-25 PROCEDURE — D9220A PRA ANESTHESIA: ICD-10-PCS | Mod: ANES,,, | Performed by: ANESTHESIOLOGY

## 2022-10-25 PROCEDURE — 99215 PR OFFICE/OUTPT VISIT, EST, LEVL V, 40-54 MIN: ICD-10-PCS | Mod: S$PBB,57,, | Performed by: NURSE PRACTITIONER

## 2022-10-25 PROCEDURE — 99215 OFFICE O/P EST HI 40 MIN: CPT | Mod: S$PBB,57,, | Performed by: NURSE PRACTITIONER

## 2022-10-25 PROCEDURE — 71000015 HC POSTOP RECOV 1ST HR: Performed by: ORTHOPAEDIC SURGERY

## 2022-10-25 PROCEDURE — 71000016 HC POSTOP RECOV ADDL HR: Performed by: ORTHOPAEDIC SURGERY

## 2022-10-25 PROCEDURE — D9220A PRA ANESTHESIA: Mod: CRNA,,, | Performed by: NURSE ANESTHETIST, CERTIFIED REGISTERED

## 2022-10-25 PROCEDURE — 63600175 PHARM REV CODE 636 W HCPCS: Performed by: ORTHOPAEDIC SURGERY

## 2022-10-25 PROCEDURE — 25000003 PHARM REV CODE 250: Performed by: ANESTHESIOLOGY

## 2022-10-25 PROCEDURE — 25000003 PHARM REV CODE 250: Performed by: REGISTERED NURSE

## 2022-10-25 PROCEDURE — 63600175 PHARM REV CODE 636 W HCPCS: Performed by: ANESTHESIOLOGY

## 2022-10-25 PROCEDURE — 25000003 PHARM REV CODE 250: Performed by: NURSE ANESTHETIST, CERTIFIED REGISTERED

## 2022-10-25 PROCEDURE — 63600175 PHARM REV CODE 636 W HCPCS: Performed by: NURSE ANESTHETIST, CERTIFIED REGISTERED

## 2022-10-25 PROCEDURE — 73610 XR ANKLE COMPLETE 3 VIEW LEFT: ICD-10-PCS | Mod: 26,LT,, | Performed by: RADIOLOGY

## 2022-10-25 PROCEDURE — D9220A PRA ANESTHESIA: ICD-10-PCS | Mod: CRNA,,, | Performed by: NURSE ANESTHETIST, CERTIFIED REGISTERED

## 2022-10-25 PROCEDURE — 64447 NJX AA&/STRD FEMORAL NRV IMG: CPT | Mod: 59,LT,, | Performed by: STUDENT IN AN ORGANIZED HEALTH CARE EDUCATION/TRAINING PROGRAM

## 2022-10-25 PROCEDURE — 1160F RVW MEDS BY RX/DR IN RCRD: CPT | Mod: CPTII,,, | Performed by: NURSE PRACTITIONER

## 2022-10-25 DEVICE — PLATE BONE FIB DIS LAT VARIAX: Type: IMPLANTABLE DEVICE | Site: FIBULA | Status: FUNCTIONAL

## 2022-10-25 DEVICE — SCREW BONE ASNIS III 4X46MM: Type: IMPLANTABLE DEVICE | Site: FIBULA | Status: FUNCTIONAL

## 2022-10-25 DEVICE — GUIDEWIRE UT 1.4X150MM: Type: IMPLANTABLE DEVICE | Site: FIBULA | Status: FUNCTIONAL

## 2022-10-25 DEVICE — SCREW BONE LOCK T10 3.5X16MM: Type: IMPLANTABLE DEVICE | Site: FIBULA | Status: FUNCTIONAL

## 2022-10-25 DEVICE — SCREW TITANIUM NONLOK 2.7X16MM: Type: IMPLANTABLE DEVICE | Site: FIBULA | Status: FUNCTIONAL

## 2022-10-25 DEVICE — SCREW BONE NON LOCK 3.5X14MM: Type: IMPLANTABLE DEVICE | Site: FIBULA | Status: FUNCTIONAL

## 2022-10-25 DEVICE — SCREW BONE LOCK T10 3.5X14MM: Type: IMPLANTABLE DEVICE | Site: FIBULA | Status: FUNCTIONAL

## 2022-10-25 DEVICE — GUIDEWIRE ORTHO 1.6X150MM: Type: IMPLANTABLE DEVICE | Site: FIBULA | Status: FUNCTIONAL

## 2022-10-25 RX ORDER — DEXAMETHASONE SODIUM PHOSPHATE 4 MG/ML
INJECTION, SOLUTION INTRA-ARTICULAR; INTRALESIONAL; INTRAMUSCULAR; INTRAVENOUS; SOFT TISSUE
Status: DISCONTINUED | OUTPATIENT
Start: 2022-10-25 | End: 2022-10-25

## 2022-10-25 RX ORDER — PROCHLORPERAZINE EDISYLATE 5 MG/ML
5 INJECTION INTRAMUSCULAR; INTRAVENOUS EVERY 30 MIN PRN
Status: DISCONTINUED | OUTPATIENT
Start: 2022-10-25 | End: 2022-10-25 | Stop reason: HOSPADM

## 2022-10-25 RX ORDER — IBUPROFEN 800 MG/1
800 TABLET ORAL 3 TIMES DAILY
Qty: 90 TABLET | Refills: 3 | Status: SHIPPED | OUTPATIENT
Start: 2022-10-25

## 2022-10-25 RX ORDER — CEFAZOLIN SODIUM/WATER 2 G/20 ML
2 SYRINGE (ML) INTRAVENOUS
Status: COMPLETED | OUTPATIENT
Start: 2022-10-25 | End: 2022-10-25

## 2022-10-25 RX ORDER — PANTOPRAZOLE SODIUM 40 MG/1
1 TABLET, DELAYED RELEASE ORAL DAILY
COMMUNITY
Start: 2022-08-18

## 2022-10-25 RX ORDER — OXYCODONE HYDROCHLORIDE 5 MG/1
5 TABLET ORAL EVERY 4 HOURS PRN
Qty: 25 TABLET | Refills: 0 | Status: SHIPPED | OUTPATIENT
Start: 2022-10-25 | End: 2022-11-08

## 2022-10-25 RX ORDER — MUPIROCIN 20 MG/G
OINTMENT TOPICAL
Status: DISCONTINUED | OUTPATIENT
Start: 2022-10-25 | End: 2022-10-25 | Stop reason: HOSPADM

## 2022-10-25 RX ORDER — MUPIROCIN 20 MG/G
OINTMENT TOPICAL
Status: CANCELLED | OUTPATIENT
Start: 2022-10-25

## 2022-10-25 RX ORDER — HALOPERIDOL 5 MG/ML
0.5 INJECTION INTRAMUSCULAR EVERY 10 MIN PRN
Status: DISCONTINUED | OUTPATIENT
Start: 2022-10-25 | End: 2022-10-25 | Stop reason: HOSPADM

## 2022-10-25 RX ORDER — ACETAMINOPHEN 500 MG
1000 TABLET ORAL 3 TIMES DAILY
Qty: 90 TABLET | Refills: 5 | Status: SHIPPED | OUTPATIENT
Start: 2022-10-25

## 2022-10-25 RX ORDER — PREGABALIN 150 MG/1
150 CAPSULE ORAL 2 TIMES DAILY
COMMUNITY
Start: 2022-05-11

## 2022-10-25 RX ORDER — ACETAMINOPHEN 10 MG/ML
INJECTION, SOLUTION INTRAVENOUS
Status: DISCONTINUED | OUTPATIENT
Start: 2022-10-25 | End: 2022-10-25

## 2022-10-25 RX ORDER — BUPIVACAINE HYDROCHLORIDE 5 MG/ML
INJECTION, SOLUTION EPIDURAL; INTRACAUDAL
Status: COMPLETED | OUTPATIENT
Start: 2022-10-25 | End: 2022-10-25

## 2022-10-25 RX ORDER — SODIUM CHLORIDE 0.9 % (FLUSH) 0.9 %
3 SYRINGE (ML) INJECTION
Status: DISCONTINUED | OUTPATIENT
Start: 2022-10-25 | End: 2022-10-25 | Stop reason: HOSPADM

## 2022-10-25 RX ORDER — HYDROMORPHONE HYDROCHLORIDE 1 MG/ML
0.2 INJECTION, SOLUTION INTRAMUSCULAR; INTRAVENOUS; SUBCUTANEOUS EVERY 5 MIN PRN
Status: DISCONTINUED | OUTPATIENT
Start: 2022-10-25 | End: 2022-10-25 | Stop reason: HOSPADM

## 2022-10-25 RX ORDER — ONDANSETRON 2 MG/ML
INJECTION INTRAMUSCULAR; INTRAVENOUS
Status: DISCONTINUED | OUTPATIENT
Start: 2022-10-25 | End: 2022-10-25

## 2022-10-25 RX ORDER — METHOCARBAMOL 500 MG/1
500 TABLET, FILM COATED ORAL 4 TIMES DAILY
Qty: 40 TABLET | Refills: 0 | Status: SHIPPED | OUTPATIENT
Start: 2022-10-25 | End: 2022-11-04

## 2022-10-25 RX ORDER — CEFAZOLIN SODIUM 2 G/50ML
2 SOLUTION INTRAVENOUS
Status: CANCELLED | OUTPATIENT
Start: 2022-10-25

## 2022-10-25 RX ORDER — VANCOMYCIN HCL IN 5 % DEXTROSE 1G/250ML
1000 PLASTIC BAG, INJECTION (ML) INTRAVENOUS ONCE
Status: COMPLETED | OUTPATIENT
Start: 2022-10-25 | End: 2022-10-25

## 2022-10-25 RX ORDER — HYDROMORPHONE HYDROCHLORIDE 2 MG/ML
INJECTION, SOLUTION INTRAMUSCULAR; INTRAVENOUS; SUBCUTANEOUS
Status: DISCONTINUED | OUTPATIENT
Start: 2022-10-25 | End: 2022-10-25

## 2022-10-25 RX ORDER — LIDOCAINE HYDROCHLORIDE 10 MG/ML
INJECTION, SOLUTION EPIDURAL; INFILTRATION; INTRACAUDAL; PERINEURAL
Status: DISCONTINUED
Start: 2022-10-25 | End: 2022-10-25 | Stop reason: HOSPADM

## 2022-10-25 RX ORDER — BUPIVACAINE HYDROCHLORIDE 5 MG/ML
INJECTION, SOLUTION EPIDURAL; INTRACAUDAL
Status: COMPLETED
Start: 2022-10-25 | End: 2022-10-25

## 2022-10-25 RX ORDER — ROCURONIUM BROMIDE 10 MG/ML
INJECTION, SOLUTION INTRAVENOUS
Status: DISCONTINUED | OUTPATIENT
Start: 2022-10-25 | End: 2022-10-25

## 2022-10-25 RX ORDER — KETAMINE HCL IN 0.9 % NACL 50 MG/5 ML
SYRINGE (ML) INTRAVENOUS
Status: DISCONTINUED | OUTPATIENT
Start: 2022-10-25 | End: 2022-10-25

## 2022-10-25 RX ORDER — ASPIRIN 81 MG/1
81 TABLET ORAL 2 TIMES DAILY
Qty: 42 TABLET | Refills: 0 | Status: SHIPPED | OUTPATIENT
Start: 2022-10-25 | End: 2023-10-25

## 2022-10-25 RX ORDER — FENTANYL CITRATE 50 UG/ML
25 INJECTION, SOLUTION INTRAMUSCULAR; INTRAVENOUS EVERY 5 MIN PRN
Status: DISCONTINUED | OUTPATIENT
Start: 2022-10-25 | End: 2022-10-25 | Stop reason: HOSPADM

## 2022-10-25 RX ORDER — METHADONE HYDROCHLORIDE 10 MG/1
80 TABLET ORAL DAILY
COMMUNITY

## 2022-10-25 RX ORDER — VANCOMYCIN HYDROCHLORIDE 1 G/20ML
INJECTION, POWDER, LYOPHILIZED, FOR SOLUTION INTRAVENOUS
Status: DISCONTINUED | OUTPATIENT
Start: 2022-10-25 | End: 2022-10-25 | Stop reason: HOSPADM

## 2022-10-25 RX ORDER — PROPOFOL 10 MG/ML
VIAL (ML) INTRAVENOUS
Status: DISCONTINUED | OUTPATIENT
Start: 2022-10-25 | End: 2022-10-25

## 2022-10-25 RX ORDER — DIPHENHYDRAMINE HYDROCHLORIDE 50 MG/ML
25 INJECTION INTRAMUSCULAR; INTRAVENOUS EVERY 6 HOURS PRN
Status: DISCONTINUED | OUTPATIENT
Start: 2022-10-25 | End: 2022-10-25 | Stop reason: HOSPADM

## 2022-10-25 RX ORDER — FENTANYL CITRATE 50 UG/ML
25-200 INJECTION, SOLUTION INTRAMUSCULAR; INTRAVENOUS
Status: DISCONTINUED | OUTPATIENT
Start: 2022-10-25 | End: 2022-10-25 | Stop reason: HOSPADM

## 2022-10-25 RX ORDER — LIDOCAINE HYDROCHLORIDE 20 MG/ML
INJECTION, SOLUTION EPIDURAL; INFILTRATION; INTRACAUDAL; PERINEURAL
Status: DISCONTINUED | OUTPATIENT
Start: 2022-10-25 | End: 2022-10-25

## 2022-10-25 RX ORDER — MIDAZOLAM HYDROCHLORIDE 1 MG/ML
INJECTION, SOLUTION INTRAMUSCULAR; INTRAVENOUS
Status: DISCONTINUED | OUTPATIENT
Start: 2022-10-25 | End: 2022-10-25

## 2022-10-25 RX ORDER — DEXMEDETOMIDINE HYDROCHLORIDE 100 UG/ML
INJECTION, SOLUTION INTRAVENOUS
Status: DISCONTINUED | OUTPATIENT
Start: 2022-10-25 | End: 2022-10-25

## 2022-10-25 RX ORDER — MIDAZOLAM HYDROCHLORIDE 1 MG/ML
.5-4 INJECTION INTRAMUSCULAR; INTRAVENOUS
Status: DISCONTINUED | OUTPATIENT
Start: 2022-10-25 | End: 2022-10-25 | Stop reason: HOSPADM

## 2022-10-25 RX ORDER — FENTANYL CITRATE 50 UG/ML
INJECTION, SOLUTION INTRAMUSCULAR; INTRAVENOUS
Status: DISCONTINUED | OUTPATIENT
Start: 2022-10-25 | End: 2022-10-25

## 2022-10-25 RX ORDER — SERTRALINE HYDROCHLORIDE 25 MG/1
1 TABLET, FILM COATED ORAL EVERY MORNING
COMMUNITY
Start: 2022-09-14 | End: 2023-09-14

## 2022-10-25 RX ADMIN — DEXMEDETOMIDINE HYDROCHLORIDE 12 MCG: 100 INJECTION, SOLUTION INTRAVENOUS at 05:10

## 2022-10-25 RX ADMIN — DEXAMETHASONE SODIUM PHOSPHATE 4 MG: 4 INJECTION INTRA-ARTICULAR; INTRALESIONAL; INTRAMUSCULAR; INTRAVENOUS; SOFT TISSUE at 03:10

## 2022-10-25 RX ADMIN — FENTANYL CITRATE 100 MCG: 50 INJECTION, SOLUTION INTRAMUSCULAR; INTRAVENOUS at 02:10

## 2022-10-25 RX ADMIN — PROPOFOL 200 MG: 10 INJECTION, EMULSION INTRAVENOUS at 03:10

## 2022-10-25 RX ADMIN — MIDAZOLAM 2 MG: 1 INJECTION INTRAMUSCULAR; INTRAVENOUS at 02:10

## 2022-10-25 RX ADMIN — ACETAMINOPHEN 1000 MG: 10 INJECTION INTRAVENOUS at 03:10

## 2022-10-25 RX ADMIN — FENTANYL CITRATE 50 MCG: 0.05 INJECTION, SOLUTION INTRAMUSCULAR; INTRAVENOUS at 05:10

## 2022-10-25 RX ADMIN — DEXAMETHASONE SODIUM PHOSPHATE 4 MG: 4 INJECTION INTRA-ARTICULAR; INTRALESIONAL; INTRAMUSCULAR; INTRAVENOUS; SOFT TISSUE at 06:10

## 2022-10-25 RX ADMIN — HYDROMORPHONE HYDROCHLORIDE 0.2 MG: 2 INJECTION INTRAMUSCULAR; INTRAVENOUS; SUBCUTANEOUS at 06:10

## 2022-10-25 RX ADMIN — DEXMEDETOMIDINE HYDROCHLORIDE 4 MCG: 100 INJECTION, SOLUTION INTRAVENOUS at 06:10

## 2022-10-25 RX ADMIN — ONDANSETRON 4 MG: 2 INJECTION INTRAMUSCULAR; INTRAVENOUS at 05:10

## 2022-10-25 RX ADMIN — SODIUM CHLORIDE: 0.9 INJECTION, SOLUTION INTRAVENOUS at 01:10

## 2022-10-25 RX ADMIN — DEXMEDETOMIDINE HYDROCHLORIDE 4 MCG: 100 INJECTION, SOLUTION INTRAVENOUS at 07:10

## 2022-10-25 RX ADMIN — HALOPERIDOL LACTATE 0.5 MG: 5 INJECTION, SOLUTION INTRAMUSCULAR at 08:10

## 2022-10-25 RX ADMIN — FENTANYL CITRATE 100 MCG: 0.05 INJECTION, SOLUTION INTRAMUSCULAR; INTRAVENOUS at 03:10

## 2022-10-25 RX ADMIN — Medication 2 G: at 03:10

## 2022-10-25 RX ADMIN — SODIUM CHLORIDE, SODIUM GLUCONATE, SODIUM ACETATE, POTASSIUM CHLORIDE, MAGNESIUM CHLORIDE, SODIUM PHOSPHATE, DIBASIC, AND POTASSIUM PHOSPHATE: .53; .5; .37; .037; .03; .012; .00082 INJECTION, SOLUTION INTRAVENOUS at 04:10

## 2022-10-25 RX ADMIN — BUPIVACAINE HYDROCHLORIDE 30 ML: 5 INJECTION, SOLUTION EPIDURAL; INTRACAUDAL at 02:10

## 2022-10-25 RX ADMIN — ROCURONIUM BROMIDE 50 MG: 10 INJECTION INTRAVENOUS at 03:10

## 2022-10-25 RX ADMIN — BUPIVACAINE HYDROCHLORIDE 20 ML: 5 INJECTION, SOLUTION EPIDURAL; INTRACAUDAL; PERINEURAL at 02:10

## 2022-10-25 RX ADMIN — ROCURONIUM BROMIDE 25 MG: 10 INJECTION INTRAVENOUS at 04:10

## 2022-10-25 RX ADMIN — LIDOCAINE HYDROCHLORIDE 100 MG: 20 INJECTION, SOLUTION EPIDURAL; INFILTRATION; INTRACAUDAL; PERINEURAL at 03:10

## 2022-10-25 RX ADMIN — MUPIROCIN: 20 OINTMENT TOPICAL at 01:10

## 2022-10-25 RX ADMIN — VANCOMYCIN HYDROCHLORIDE 1000 MG: 1 INJECTION, POWDER, LYOPHILIZED, FOR SOLUTION INTRAVENOUS at 02:10

## 2022-10-25 RX ADMIN — MIDAZOLAM 2 MG: 1 INJECTION INTRAMUSCULAR; INTRAVENOUS at 03:10

## 2022-10-25 RX ADMIN — Medication 50 MG: at 03:10

## 2022-10-25 RX ADMIN — FENTANYL CITRATE 25 MCG: 50 INJECTION, SOLUTION INTRAMUSCULAR; INTRAVENOUS at 08:10

## 2022-10-25 NOTE — ANESTHESIA PROCEDURE NOTES
Left Adductor SS Nerve Block    Patient location during procedure: pre-op   Block not for primary anesthetic.  Reason for block: post-op pain management   Post-op Pain Location: Left leg surgery   Start time: 10/25/2022 2:25 PM  Timeout: 10/25/2022 2:25 PM   End time: 10/25/2022 2:45 PM    Staffing  Authorizing Provider: Merry Masterson MD  Performing Provider: Shaw Tirado MD    Preanesthetic Checklist  Completed: patient identified, IV checked, site marked, risks and benefits discussed, surgical consent, monitors and equipment checked, pre-op evaluation and timeout performed  Peripheral Block  Patient position: supine  Prep: ChloraPrep  Patient monitoring: heart rate, cardiac monitor, continuous pulse ox, continuous capnometry and frequent blood pressure checks  Block type: adductor canal  Laterality: left  Injection technique: single shot  Needle  Needle type: Stimuplex   Needle gauge: 20 G  Needle length: 4 in  Needle localization: anatomical landmarks and ultrasound guidance   -ultrasound image captured on disc.  Assessment  Injection assessment: negative aspiration, negative parasthesia and local visualized surrounding nerve  Paresthesia pain: none  Heart rate change: no  Slow fractionated injection: yes  Pain Tolerance: comfortable throughout block and no complaints  Medications:    Medications: bupivacaine (pf) (MARCAINE) injection 0.5% - Perineural   20 mL - 10/25/2022 2:44:00 PM    Additional Notes  VSS.  [facility] RN monitoring vitals throughout procedure.  Patient tolerated procedure well.

## 2022-10-25 NOTE — ANESTHESIA PREPROCEDURE EVALUATION
10/25/2022  Pre-operative evaluation for Procedure(s) (LRB):  ORIF, ANKLE (TRIMAL) - diving board, supine, bone foam. Talia. variax2 fibula, 4.0 cannulated (Left)    Hudson Alex is a 32 y.o. male here for above. PMHx of substance abuse    Patient Active Problem List   Diagnosis    Closed fracture of left ankle    Abnormal liver enzymes    ADHD (attention deficit hyperactivity disorder)    Anxiety    Common migraine    Gastroesophageal reflux disease    Neuralgia    Plantar fasciitis    Thrombocytopenia    Vitamin D deficiency       Review of patient's allergies indicates:   Allergen Reactions    Sulfa (sulfonamide antibiotics) Anaphylaxis       No current facility-administered medications on file prior to encounter.     Current Outpatient Medications on File Prior to Encounter   Medication Sig Dispense Refill    HYDROcodone-acetaminophen (NORCO)  mg per tablet Take 1 tablet by mouth every 4 (four) hours as needed for Pain. 10 tablet 0    ibuprofen (ADVIL,MOTRIN) 800 MG tablet Take 1 tablet (800 mg total) by mouth every 6 (six) hours as needed for Pain. 20 tablet 0       No past surgical history on file.    Social History     Socioeconomic History    Marital status: Single             Pre-op Assessment    I have reviewed the Patient Summary Reports.     I have reviewed the Nursing Notes. I have reviewed the NPO Status.      Review of Systems  Anesthesia Hx:  No problems with previous Anesthesia    Hepatic/GI:   GERD    Neurological:   Headaches    Psych:   Psychiatric History          Physical Exam    Airway:  Mallampati: II   Mouth Opening: Normal  Tongue: Normal    Dental:    Chest/Lungs:  Normal Respiratory Rate    Heart:  Rhythm: Regular Rhythm        Anesthesia Plan  Type of Anesthesia, risks & benefits discussed:    Anesthesia Type: Gen ETT  Intra-op Monitoring Plan:  Standard ASA Monitors  Post Op Pain Control Plan: multimodal analgesia  Induction:  IV  Informed Consent: Informed consent signed with the Patient and all parties understand the risks and agree with anesthesia plan.  All questions answered.   ASA Score: 2    Ready For Surgery From Anesthesia Perspective.     .

## 2022-10-25 NOTE — PLAN OF CARE
Patient was prepared for surgery.    The patient mentioned that he is in recovery and has difficult veins. Anesthesia is at the bedside getting IV with ultrasound machine.

## 2022-10-25 NOTE — ANESTHESIA PROCEDURE NOTES
Intubation    Date/Time: 10/25/2022 3:22 PM  Performed by: Candelaria Spann CRNA  Authorized by: Shilo Dawson MD     Intubation:     Induction:  Intravenous    Intubated:  Postinduction    Mask Ventilation:  Easy mask    Attempts:  1    Attempted By:  CRNA    Method of Intubation:  Direct    Blade:  Hazel 2    Laryngeal View Grade: Grade I - full view of cords      Difficult Airway Encountered?: No      Complications:  None    Airway Device:  Oral endotracheal tube    Airway Device Size:  7.5    Style/Cuff Inflation:  Cuffed    Inflation Amount (mL):  6    Tube secured:  21    Secured at:  The lips    Placement Verified By:  Capnometry    Complicating Factors:  None    Findings Post-Intubation:  BS equal bilateral and atraumatic/condition of teeth unchanged

## 2022-10-25 NOTE — PROGRESS NOTES
"  SUBJECTIVE:     Chief Complaint & History of Present Illness:  Hudson Alex is a New 32 y.o. year old male patient here for ED follow up regarding his right trimalleolar ankle fracture.  He reports on 10/14/22, he slipped on a wet floor and twisted his ankle.  He initially went to an outside facility but left after a long wait.  He proceeded to drive himself to [facility] ED where Ortho evaluated him and recommended surgical intervention.  At the time of his ED visit, his skin was too swollen and he had fracture blisters and therefore, he was splinted and told to follow up in clinic for a skin check.    Currently, he reports his pain is controlled.  He has kept his weight off his RLE as instructed.  He is currently using Methadone for chronic pain control.  He denies foot/toe numbness.  He said he has remained NPO except taking his methadone earlier today.      Principle Orthopedic Problem    ICD-10-CM ICD-9-CM   1. Left trimalleolar fracture, closed, initial encounter  S82.852A 824.6       Date of injury 10/14/2022    Lives Home at home with Family             Independent community ambulator, no gait aids,    Works as a contractor/construction   Does not use tobacco   Does not have diabetes   Does not have a history of heart attack, stroke, blood clot, cancer   Estimated body mass index is 30 kg/m² as calculated from the following:    Height as of 10/22/22: 5' 8" (1.727 m).    Weight as of 10/22/22: 89.5 kg (197 lb 5 oz).      Review of patient's allergies indicates:   Allergen Reactions    Sulfa (sulfonamide antibiotics)          Current Outpatient Medications   Medication Sig Dispense Refill    HYDROcodone-acetaminophen (NORCO)  mg per tablet Take 1 tablet by mouth every 4 (four) hours as needed for Pain. 10 tablet 0    ibuprofen (ADVIL,MOTRIN) 800 MG tablet Take 1 tablet (800 mg total) by mouth every 6 (six) hours as needed for Pain. 20 tablet 0    ondansetron (ZOFRAN-ODT) 4 MG TbDL Take 1 tablet (4 " "mg total) by mouth every 6 (six) hours as needed (Nausea). 20 tablet 0     No current facility-administered medications for this visit.       No past medical history on file.    No past surgical history on file.    No family history on file.      Review of Systems:  ROS:  Constitutional: no fever or chills  Eyes: no visual changes  ENT: no nasal congestion or sore throat  Respiratory: no cough or shortness of breath  Cardiovascular: no chest pain or palpitations  Gastrointestinal: no nausea or vomiting, tolerating diet  Genitourinary: no hematuria or dysuria  Integument/Breast: no rash or pruritis  Hematologic/Lymphatic: no easy bruising or lymphadenopathy  Musculoskeletal:  left ankle fracture  Neurological: no seizures or tremors  Behavioral/Psych: no auditory or visual hallucinations  Endocrine: no heat or cold intolerance      OBJECTIVE:     PHYSICAL EXAM:  Vital Signs (Most Recent)  There were no vitals filed for this visit.     ,   Estimated body mass index is 30 kg/m² as calculated from the following:    Height as of 10/22/22: 5' 8" (1.727 m).    Weight as of 10/22/22: 89.5 kg (197 lb 5 oz).   General Appearance: Well nourished, well developed, in no acute distress.  HENT: Normal cephalic, oropharynx pink and moist  Eyes: PERRLA bilaterally and EOM x 4  Respiratory: Even and unlabored  Skin: Warm and Dry.   Psychiatric: AAO x 4, Mood & affect are normal.    left  Foot/Ankle    General appearance: no acute distress, alert/oriented x3, appropriate mood and affect, looks stated age, and well nourished  The examination was performed out of splint/cast  Skin: fracture blisters that are dried up    Swelling: none and minimal, skin wrinkles  Warmth: no warmth  Tenderness: mild  Neurological Exam: normal  Vascular Exam: normal and pulse present    soft tissue swelling noted over the lateral and medial ankle.      RADIOGRAPHS:  Left ankle x-ray was obtained, findings show a trimalleolar ankle with widening of the " medial malleolar.  Findings discussed with Dr. Cano.  All radiographs were personally reviewed by me.    ASSESSMENT/PLAN:       ICD-10-CM ICD-9-CM   1. Left trimalleolar fracture, closed, initial encounter  S82.852A 824.6       Plan:  -Hudson Alex presents to clinic today for skin check in preparation for ORIF of his left ankle fracture.  -After discussion with Dr. Cano today, plan is for surgical intervention today.  Patient placed into a tall cam boot and send to [facility].  -Consents signed in clinic.    Pre, zena, and post-operative procedure and expectations were discussed.  Questions were answered. The patient has been educated and is ready to proceed with surgery.  Approximately 30 minutes was spent discussing surgical outcomes, plans, procedures, pre, zena, and post-operative expectations and care. The risks, benefits and alternatives to surgery were discussed with the patient at great length.  These include bleeding, infection, vessel/nerve damage, pain, numbness, tingling, complex regional pain syndrome, hardware/surgical failure, need for further surgery, malunion, nonunion, DVT, PE, arthritis and death.     Hudson also understands that the risks of surgery may be greater for some patients due to health or lifestyle issues, such as a current condition or a history of heart disease, obesity, clotting disorders, recurrent infections, smoking, sedentary lifestyle, or noncompliance with medications, therapy, or follow-up. The degree of the increased risk is hard to estimate with any degree of precision.      Patient states an understanding and wishes to proceed with surgery.   All questions were answered.  No guarantees were implied or stated.  Informed consent was obtained.  The patient will contact us if they have any questions, concerns, and changes in their medical condition prior to surgery.

## 2022-10-25 NOTE — ANESTHESIA PROCEDURE NOTES
Left Popliteal SS Nerve Block    Patient location during procedure: pre-op   Block not for primary anesthetic.  Reason for block: at surgeon's request and post-op pain management   Post-op Pain Location: Left Leg pain   Start time: 10/25/2022 2:25 PM  Timeout: 10/25/2022 2:25 PM   End time: 10/25/2022 2:45 PM    Staffing  Authorizing Provider: Merry Masterson MD  Performing Provider: Shaw Tirado MD    Preanesthetic Checklist  Completed: patient identified, IV checked, site marked, risks and benefits discussed, surgical consent, monitors and equipment checked, pre-op evaluation and timeout performed  Peripheral Block  Patient position: supine  Prep: ChloraPrep  Patient monitoring: heart rate, cardiac monitor, continuous pulse ox, continuous capnometry and frequent blood pressure checks  Block type: popliteal  Laterality: left  Injection technique: single shot  Needle  Needle type: Stimuplex   Needle gauge: 20 G  Needle length: 4 in  Needle localization: anatomical landmarks and ultrasound guidance   -ultrasound image captured on disc.  Assessment  Injection assessment: negative aspiration, negative parasthesia and local visualized surrounding nerve  Paresthesia pain: none  Heart rate change: no  Slow fractionated injection: yes  Pain Tolerance: comfortable throughout block and no complaints  Medications:    Medications: bupivacaine (pf) (MARCAINE) injection 0.5% - Perineural   30 mL - 10/25/2022 2:40:00 PM    Additional Notes  VSS.  [facility] RN monitoring vitals throughout procedure.  Patient tolerated procedure well.

## 2022-10-25 NOTE — H&P
"  SUBJECTIVE:     Chief Complaint & History of Present Illness:  Hudson Alex is a New 32 y.o. year old male patient here for ED follow up regarding his right trimalleolar ankle fracture.  He reports on 10/14/22, he slipped on a wet floor and twisted his ankle.  He initially went to an outside facility but left after a long wait.  He proceeded to drive himself to [facility] where Ortho evaluated him and recommended surgical intervention.  At the time of his ED visit, his skin was too swollen and he had fracture blisters and therefore, he was splinted and told to follow up in clinic for a skin check.    Currently, he reports his pain is controlled.  He has kept his weight off his RLE as instructed.  He is currently using Methadone for chronic pain control.  He denies foot/toe numbness.  He said he has remained NPO except taking his methadone earlier today.      Principle Orthopedic Problem    ICD-10-CM ICD-9-CM   1. Left trimalleolar fracture, closed, initial encounter  S82.852A 824.6       Date of injury 10/14/2022    Lives Home at home with Family             Independent community ambulator, no gait aids,    Works as a contractor/construction   Does not use tobacco   Does not have diabetes   Does not have a history of heart attack, stroke, blood clot, cancer   Estimated body mass index is 30 kg/m² as calculated from the following:    Height as of 10/22/22: 5' 8" (1.727 m).    Weight as of 10/22/22: 89.5 kg (197 lb 5 oz).      Review of patient's allergies indicates:   Allergen Reactions    Sulfa (sulfonamide antibiotics)          Current Outpatient Medications   Medication Sig Dispense Refill    HYDROcodone-acetaminophen (NORCO)  mg per tablet Take 1 tablet by mouth every 4 (four) hours as needed for Pain. 10 tablet 0    ibuprofen (ADVIL,MOTRIN) 800 MG tablet Take 1 tablet (800 mg total) by mouth every 6 (six) hours as needed for Pain. 20 tablet 0    ondansetron (ZOFRAN-ODT) 4 MG TbDL Take 1 tablet (4 " "mg total) by mouth every 6 (six) hours as needed (Nausea). 20 tablet 0     No current facility-administered medications for this visit.       No past medical history on file.    No past surgical history on file.    No family history on file.      Review of Systems:  ROS:  Constitutional: no fever or chills  Eyes: no visual changes  ENT: no nasal congestion or sore throat  Respiratory: no cough or shortness of breath  Cardiovascular: no chest pain or palpitations  Gastrointestinal: no nausea or vomiting, tolerating diet  Genitourinary: no hematuria or dysuria  Integument/Breast: no rash or pruritis  Hematologic/Lymphatic: no easy bruising or lymphadenopathy  Musculoskeletal: left ankle fracture  Neurological: no seizures or tremors  Behavioral/Psych: no auditory or visual hallucinations  Endocrine: no heat or cold intolerance      OBJECTIVE:     PHYSICAL EXAM:  Vital Signs (Most Recent)  There were no vitals filed for this visit.     ,   Estimated body mass index is 30 kg/m² as calculated from the following:    Height as of 10/22/22: 5' 8" (1.727 m).    Weight as of 10/22/22: 89.5 kg (197 lb 5 oz).   General Appearance: Well nourished, well developed, in no acute distress.  HENT: Normal cephalic, oropharynx pink and moist  Eyes: PERRLA bilaterally and EOM x 4  Respiratory: Even and unlabored  Skin: Warm and Dry.   Psychiatric: AAO x 4, Mood & affect are normal.    left  Foot/Ankle    General appearance: no acute distress, alert/oriented x3, appropriate mood and affect, looks stated age, and well nourished  The examination was performed out of splint/cast  Skin: fracture blisters that are dried up    Swelling: none and minimal, skin wrinkles  Warmth: no warmth  Tenderness: mild  Neurological Exam: normal  Vascular Exam: normal and pulse present    soft tissue swelling noted over the lateral and medial ankle.      RADIOGRAPHS:  Left ankle x-ray was obtained, findings show a trimalleolar ankle with widening of the " medial malleolar.  Findings discussed with Dr. Cano.  All radiographs were personally reviewed by me.    ASSESSMENT/PLAN:       ICD-10-CM ICD-9-CM   1. Left trimalleolar fracture, closed, initial encounter  S82.852A 824.6       Plan:  -Hudson Alex presents to clinic today for skin check in preparation for ORIF of his left ankle fracture.  -After discussion with Dr. Cano today, plan is for surgical intervention today.  Patient placed into a tall cam boot and send to [facility].  -Consents signed in clinic.    Pre, zena, and post-operative procedure and expectations were discussed.  Questions were answered. The patient has been educated and is ready to proceed with surgery.  Approximately 30 minutes was spent discussing surgical outcomes, plans, procedures, pre, zena, and post-operative expectations and care. The risks, benefits and alternatives to surgery were discussed with the patient at great length.  These include bleeding, infection, vessel/nerve damage, pain, numbness, tingling, complex regional pain syndrome, hardware/surgical failure, need for further surgery, malunion, nonunion, DVT, PE, arthritis and death.     Hudson also understands that the risks of surgery may be greater for some patients due to health or lifestyle issues, such as a current condition or a history of heart disease, obesity, clotting disorders, recurrent infections, smoking, sedentary lifestyle, or noncompliance with medications, therapy, or follow-up. The degree of the increased risk is hard to estimate with any degree of precision.      Patient states an understanding and wishes to proceed with surgery.   All questions were answered.  No guarantees were implied or stated.  Informed consent was obtained.  The patient will contact us if they have any questions, concerns, and changes in their medical condition prior to surgery.

## 2022-10-26 DIAGNOSIS — S82.852A LEFT TRIMALLEOLAR FRACTURE, CLOSED, INITIAL ENCOUNTER: Primary | ICD-10-CM

## 2022-10-26 RX ORDER — GABAPENTIN 100 MG/1
100 CAPSULE ORAL 2 TIMES DAILY
Qty: 14 CAPSULE | Refills: 0 | Status: SHIPPED | OUTPATIENT
Start: 2022-10-26 | End: 2022-11-02

## 2022-10-26 NOTE — TRANSFER OF CARE
"Anesthesia Transfer of Care Note    Patient: Hudson Alex    Procedure(s) Performed: Procedure(s) (LRB):  ORIF, ANKLE (TRIMAL) (Left)    Anesthesia PACU Handoff    Last vitals:   Visit Vitals  /77   Pulse 80   Temp 36.7 °C (98.1 °F) (Oral)   Resp 18   Ht 5' 8" (1.727 m)   Wt 85.3 kg (188 lb)   SpO2 99%   BMI 28.59 kg/m²     "

## 2022-10-26 NOTE — H&P
"SUBJECTIVE:      Chief Complaint & History of Present Illness:  Hudson Alex is a New 32 y.o. year old male patient here for ED follow up regarding his right trimalleolar ankle fracture.  He reports on 10/14/22, he slipped on a wet floor and twisted his ankle.  He initially went to an outside facility but left after a long wait.  He proceeded to drive himself to [facility] where Ortho evaluated him and recommended surgical intervention.  At the time of his ED visit, his skin was too swollen and he had fracture blisters and therefore, he was splinted and told to follow up in clinic for a skin check.     Currently, he reports his pain is controlled.  He has kept his weight off his RLE as instructed.  He is currently using Methadone for chronic pain control.  He denies foot/toe numbness.  He said he has remained NPO except taking his methadone earlier today.       Principle Orthopedic Problem      ICD-10-CM ICD-9-CM   1. Left trimalleolar fracture, closed, initial encounter  S82.852A 824.6         Date of injury 10/14/2022    Lives Home at home with Family             Independent community ambulator, no gait aids,    Works as a contractor/construction   Does not use tobacco   Does not have diabetes   Does not have a history of heart attack, stroke, blood clot, cancer   Estimated body mass index is 30 kg/m² as calculated from the following:    Height as of 10/22/22: 5' 8" (1.727 m).    Weight as of 10/22/22: 89.5 kg (197 lb 5 oz).             Review of patient's allergies indicates:   Allergen Reactions    Sulfa (sulfonamide antibiotics)                   Current Outpatient Medications   Medication Sig Dispense Refill    HYDROcodone-acetaminophen (NORCO)  mg per tablet Take 1 tablet by mouth every 4 (four) hours as needed for Pain. 10 tablet 0    ibuprofen (ADVIL,MOTRIN) 800 MG tablet Take 1 tablet (800 mg total) by mouth every 6 (six) hours as needed for Pain. 20 tablet 0    ondansetron (ZOFRAN-ODT) 4 MG " "TbDL Take 1 tablet (4 mg total) by mouth every 6 (six) hours as needed (Nausea). 20 tablet 0      No current facility-administered medications for this visit.         No past medical history on file.     No past surgical history on file.     No family history on file.        Review of Systems:  ROS:  Constitutional: no fever or chills  Eyes: no visual changes  ENT: no nasal congestion or sore throat  Respiratory: no cough or shortness of breath  Cardiovascular: no chest pain or palpitations  Gastrointestinal: no nausea or vomiting, tolerating diet  Genitourinary: no hematuria or dysuria  Integument/Breast: no rash or pruritis  Hematologic/Lymphatic: no easy bruising or lymphadenopathy  Musculoskeletal: left ankle fracture  Neurological: no seizures or tremors  Behavioral/Psych: no auditory or visual hallucinations  Endocrine: no heat or cold intolerance        OBJECTIVE:      PHYSICAL EXAM:  Vital Signs (Most Recent)  There were no vitals filed for this visit.     ,   Estimated body mass index is 30 kg/m² as calculated from the following:    Height as of 10/22/22: 5' 8" (1.727 m).    Weight as of 10/22/22: 89.5 kg (197 lb 5 oz).   General Appearance: Well nourished, well developed, in no acute distress.  HENT: Normal cephalic, oropharynx pink and moist  Eyes: PERRLA bilaterally and EOM x 4  Respiratory: Even and unlabored  Skin: Warm and Dry.   Psychiatric: AAO x 4, Mood & affect are normal.     left  Foot/Ankle     General appearance: no acute distress, alert/oriented x3, appropriate mood and affect, looks stated age, and well nourished  The examination was performed out of splint/cast  Skin: fracture blisters that are dried up    Swelling: none and minimal, skin wrinkles  Warmth: no warmth  Tenderness: mild  Neurological Exam: normal  Vascular Exam: normal and pulse present     soft tissue swelling noted over the lateral and medial ankle.        RADIOGRAPHS:  Left ankle x-ray was obtained, findings show a " trimalleolar ankle with widening of the medial malleolar.  Findings discussed with Dr. Cano.  All radiographs were personally reviewed by me.     ASSESSMENT/PLAN:          ICD-10-CM ICD-9-CM   1. Left trimalleolar fracture, closed, initial encounter  S82.852A 824.6         Plan:  -Hudson Alex presents to clinic today for skin check in preparation for ORIF of his left ankle fracture.  -After discussion with Dr. Cano today, plan is for surgical intervention today.  Patient placed into a tall cam boot and send to [facility].  -Consents signed in clinic.     Pre, zena, and post-operative procedure and expectations were discussed.  Questions were answered. The patient has been educated and is ready to proceed with surgery.  Approximately 30 minutes was spent discussing surgical outcomes, plans, procedures, pre, zena, and post-operative expectations and care. The risks, benefits and alternatives to surgery were discussed with the patient at great length.  These include bleeding, infection, vessel/nerve damage, pain, numbness, tingling, complex regional pain syndrome, hardware/surgical failure, need for further surgery, malunion, nonunion, DVT, PE, arthritis and death.      Hudson also understands that the risks of surgery may be greater for some patients due to health or lifestyle issues, such as a current condition or a history of heart disease, obesity, clotting disorders, recurrent infections, smoking, sedentary lifestyle, or noncompliance with medications, therapy, or follow-up. The degree of the increased risk is hard to estimate with any degree of precision.       Patient states an understanding and wishes to proceed with surgery.   All questions were answered.  No guarantees were implied or stated.  Informed consent was obtained.  The patient will contact us if they have any questions, concerns, and changes in their medical condition prior to surgery.

## 2022-10-26 NOTE — OP NOTE
OPERATIVE NOTE     DATE OF PROCEDURE:  10/25/2022     PREOPERATIVE DIAGNOSIS:           Left trimalleolar fracture, closed, displaced, initial encounter.  Fall from standing     POSTOPERATIVE DIAGNOSIS:         Left trimalleolar fracture, closed, displaced, initial encounter.  Fall from standing     PROCEDURE:              Open reduction internal fixation left trimalleolar fracture with fixation of posterior lip     SURGEON:       Hernando Cano MD     ASSISTANT:                 None     ANESTHESIA:              Regional block + general     EBL:                  25 mL     COMPLICATIONS:  None     IMPLANTS:       Talia  Lateral malleolus  2.7 mm cortical lag screw, x1  Variax 2 anatomic fibular plate  3.5 mm cortical screw, x3  3.5 mm locking screw, x4  Medial malleolus   4.0 mm partially threaded cannulated screw, x2  Posterior malleolus   4.0 mm partially-threaded cannulated screw, x1     SPECIMENS:    None     INDICATIONS FOR PROCEDURE:  32-year-old male fall down 1 step 10/14/2022  Immediate ankle pain, inability bear weight.  Following the injury he had to drive himself to the emergency department, but left an outside hospital facility due to prolonged wait, then came to our facility ().  He presented our facility, it was noted to have significant left ankle and foot swelling, with fracture blisters around his ankle.  X-rays indicated left trimalleolar ankle fracture.  At that time I evaluated him in the emergency department and placed in a molded splint.  In the interval, he return to the emergency department after having gotten his splint wet, in order to have it replaced.    He then presented for scheduled follow-up in my Orthopedic Clinic 10/25/2022, with revised splint having been placed by the emergency department providers.  Repeat x-ray out of the splint demonstrated some persistent lateral subluxation of the ankle.  At this time his fracture blisters had healed and his soft tissue envelope.   Amenable to definitive surgery.     Lives at home with mother   Works construction   Chews tobacco, on methadone   Denies history of heart attack, stroke, blood clot, cancer, diabetes     Discussed injury, and non operative versus operative management options.  Non operative management would consist of splinting, casting, protected weight-bearing.  Given the unstable nature of his fracture, conservative management would be at higher risk for continued instability, pain, degenerative joint changes.  Discussed operative intervention the form open reduction internal fixation.  Hopefully this provides improved alignment, more stability, decreases risk of long-term degenerative changes, improve overall long-term outcome.     The risks, benefits, and alternatives to surgery were discussed with the patient and/or family.    Specific risks discussed included, but were not limited to:  Ankle pain, stiffness,  ankle arthritis, ankle instability, damage to nearby structures, including neurovascular structures leading to loss of function or loss of limb, bleeding, need for blood transfusion, pain, stiffness, scarring, numbness, tingling, weakness, compartment syndrome, malunion/nonunion, hardware failure, hardware prominence, infection, need for multiple staged procedures, prolonged antibiotics, iatrogenic fracture, heterotopic ossification, arthritis, a variety of medical complications including but not limited to heart attack, stroke, deep venous thrombosis, pulmonary embolism, prolonged hospitalization, prolonged intubation, and death.   Patient and/or family expressed an understanding and desires to proceed with surgery.   All questions were answered.  No guarantees were implied or stated.  Informed consent was obtained.        OPERATIVE PROCEDURE:  Patient met in the preoperative hold area and the correct site and side of surgery being the left lower extremity lower extremity were marked and verified.  Preoperative  regional block placed by anesthesia.  Patient brought back to the operative suite.  General anesthesia smoothly induced.  Patient transferred over to operative table.  Placed in supine position. All bony prominences were appropriately padded.  Bump placed under operative hip, bone foam placed under operative extremity.  Patient received 2 g Ancef and 1 g vancomycin for preoperative antibiotics.  The left lower extremity was then prepped and draped in normal sterile fashion.     Time-out was performed verifying the correct patient, site/side of surgery, surgical consent, radiographs as applicable, preop antibiotics, necessary equipment, anticipated blood loss, length of procedure, postoperative disposition.     We planned for both medial and lateral incisions, working through both wounds, irrigating out the joint, reducing the lateral malleolus with a lag screw, reducing the medial malleolus with screws, percutaneous fixation of posterior malleolus followed by plating of the fibula.     Esmarch was utilized, tourniquet was inflated at 300 mm mercury for approximately 120 min during the case.     Lateral approach to the ankle was made.  Skin incised with a scalpel.  Hemostasis achieved as needed electrocautery.  We sharply dissected down the distal aspect of the fibula, identified the fracture edges.  We dissected anteriorly and posteriorly enough to allow visualization of the fracture and later plate placement.  Dissected to the distal aspect of the fibula to allow plate placement.  Peroneal tendons were identified, protected, retracted posteriorly as needed.  Superficial peroneal nerve was identified, protected, retracted anteriorly as needed.  We then bluntly dissected proximally to allow later plate placement.     Next we turned our attention to the medial approach to the ankle.  Skin was incised over the medial malleolus, curving somewhat anteriorly.  Sharply dissected down to the bone. Saphenous vein and nerve  were identified, protected, retracted anteriorly as needed.  Fracture was identified, we excise some of the periosteum which was tattered and torn at the fracture edges to allow visualization of the reduction.  Dissected and visualized anterior medial shoulder.  This fracture was then booked open, hematoma, clot, debris removed, joint irrigated out.     We then turned our attention to the lateral malleolus. Fracture was booked open, hematoma, clot, debris were removed and irrigated out.  We then used pointed reduction clamp to reduce the fracture anatomically by visual keys.  We then placed a 2.7 mm cortical lag screw from anterior to posterior perpendicular fracture site to obtain nice compression.     Next returned our attention to the medial malleolus.  This was reduced with a pointed reduction clamp in keyed in nicely visually.  We assessed fracture reduction on fluoroscopic imaging were satisfied.  Placed wires for cannulated screws utilizing fluoroscopic guidance. Appropriate size 4.0 mm partially threaded cannulated screws were then placed over the wires and sequentially tightened down. Fracture remained well reduced and compressed following screw placement.     Next we turned our attention open reduction internal fixation of the posterior malleolus. This fracture was well reduced following reduction/fixation of the lateral and medial malleoli.  We then placed a percutaneous K-wire from anterior medial to posterior lateral engaging the posterior lateral fracture fragment.  When this wire engaged the posterior cortex, it was measured.  Fluoroscopic guidance confirmed appropriate wire placement.  This wire was then drilled out lateral to the Achilles tendon along the posterior lateral aspect of the ankle out of the skin.  We made a percutaneous stab incision on the posterior lateral aspect of the ankle.  We bluntly dissected down to the posterior malleolus.  An appropriate sized partially threaded cannulated  screw was then placed from posterior lateral to anterior medial securing our posterior malleolar reduction.     We then returned our attention to the lateral malleolus.  Approach and reduction with lag screw placement had already been performed.  We chose the appropriate size anatomic fibular plate.  It was pinned in place both proximally and distally.  We assessed plate placement on fluoroscopic imaging were satisfied.  We then placed a 3.5 mm cortical screw just proximal fracture to suck down the plate to the shaft.  This was then repeated in the distal cluster with cortical screw to suck the distal aspect of the plate down.  We then placed 3.5 mm locking screws distally, x3. We then placed 2 further cortical screws in the shaft to complete our proximal fixation.  Previously placed distal cluster cortical screw was changed out for appropriate size locking screw.     We then assessed our fracture reduction hardware placement both visually and on fluoroscopic imaging were satisfied.  External rotation stress test, Cotton test did not indicate any syndesmotic widening.  There was no excessive anterior to posterior translation of the fibula in this plane either.    Wounds irrigated with saline.  Hemostasis achieved as needed with electrocautery.  1 g vancomycin placed deep.  Fascia closed with 2 O Vicryl.  Deep tissue closed with 2 O Vicryl.  Subcutaneous tissue closed with 3 O Vicryl.  Skin closed with 3 O nylon.  Xeroform, dry gauze, cast padding, a short-leg plaster splint with ankle in neutral dorsiflexion applied.     At the conclusion of procedure the patient had soft and compressible compartments, brisk cap refill, palpable DP/PT pulse in the operative extremity.     Prior to final closure all counts were confirmed to be correct.  Patient tolerated the procedure well without any complications, was awoken from anesthesia, transferred PACU for further recovery.     POSTOPERATIVE PLAN:  32M, fall 10.14.22  L  trimal ankle fx     10.25.22 - ORIF L trimal ankle fracture     D/c home today - counseled patient and family member extensively and keeping his splint clean and dry and elevation postoperatively, as well as maintaining nonweightbearing status in the splint.    ASA 81 mg b.i.d. x6 weeks for DVT prophylaxis  Nonweightbearing L lower extremity x2 weeks postop.     Calcium, vitamin-D     Follow-up 2 weeks postop for removal of splint, suture removal, placement into a boot     X-rays at subsequent followups:  L ankle     Follow-up postop 2 weeks, 6 weeks, 3 months, 6 months, 1 year     =====================  Hernando Cano MD  Orthopaedic Surgery

## 2022-10-26 NOTE — PLAN OF CARE
Pt VS WNL. A/Ox4. Resting comfortably. Surgical sites CDI.  Pt voided spontaneously without complications. Ambulated to wheelchair with minimal assistance. Dc'd home with mother.Instructions reviewed and given to mother and pt. Rx instructions given to  at designated pharmacy.

## 2022-10-26 NOTE — BRIEF OP NOTE
[facility] - [facility] (2nd Fl)  Brief Operative Note    Surgery Date: 10/25/2022     Surgeon(s) and Role:     * Hernando Cano MD - Primary    Assisting Surgeon: None    Pre-op Diagnosis:  Left trimalleolar fracture, closed, initial encounter [S82.852A]    Post-op Diagnosis:  Post-Op Diagnosis Codes:     * Left trimalleolar fracture, closed, initial encounter [S82.852A]    Procedure(s) (LRB):  ORIF, ANKLE (TRIMAL) (Left)    Anesthesia: General    Operative Findings: See op note    Estimated Blood Loss: * No values recorded between 10/25/2022  4:12 PM and 10/25/2022  7:23 PM *         Specimens:   Specimen (24h ago, onward)      None              Discharge Note    OUTCOME: Patient tolerated treatment/procedure well without complication and is now ready for discharge.    DISPOSITION: Home or Self Care    FINAL DIAGNOSIS:  Left Trimalleolar ankle fracture     FOLLOWUP: In clinic    DISCHARGE INSTRUCTIONS:    Discharge Procedure Orders   Diet general     Sponge bath only until clinic visit     Keep surgical extremity elevated     Ice to affected area     No driving, operating heavy equipment or signing legal documents while taking pain medication     Leave dressing on - Keep it clean, dry, and intact until clinic visit     Call MD for:  temperature >100.4     Call MD for:  persistent nausea and vomiting     Call MD for:  severe uncontrolled pain     Call MD for:  difficulty breathing, headache or visual disturbances     Call MD for:  redness, tenderness, or signs of infection (pain, swelling, redness, odor or green/yellow discharge around incision site)     Call MD for:  hives     Call MD for:  persistent dizziness or light-headedness     Call MD for:  extreme fatigue     Shower on day dressing removed (No bath)     Weight bearing restrictions (specify)   Order Comments: Non weightbearing to LLE

## 2022-10-26 NOTE — PROGRESS NOTES
Spoke with patient, he reports he is having shooting pain after ankle surgery performed yesterday.  He does not have his Lyrica and needs to get something to help with this pain until he can get this medication.  He last filled his Lyrica on 10/11/22 and therefore knows this will not get approved, he is interested in trying Neurontin.  Advised will give him a 7 day supply so that he can get his Lyrica and resume his normal dosing.  He will call me in the morning to up date me on his pain.

## 2022-10-26 NOTE — ANESTHESIA POSTPROCEDURE EVALUATION
Anesthesia Post Evaluation    Patient: Hudson Alex    Procedure(s) Performed: Procedure(s) (LRB):  ORIF, ANKLE (TRIMAL) (Left)    Final Anesthesia Type: general      Patient location during evaluation: PACU  Patient participation: Yes- Able to Participate  Level of consciousness: awake and alert  Post-procedure vital signs: reviewed and stable  Pain management: adequate  Airway patency: patent    PONV status at discharge: No PONV  Anesthetic complications: no      Cardiovascular status: blood pressure returned to baseline  Respiratory status: unassisted, spontaneous ventilation and room air  Hydration status: euvolemic  Follow-up not needed.          Vitals Value Taken Time   /85 10/25/22 2117   Temp 36.7 °C (98.1 °F) 10/25/22 2030   Pulse 83 10/25/22 2119   Resp 12 10/25/22 2118   SpO2 99 % 10/25/22 2119   Vitals shown include unvalidated device data.      Event Time   Out of Recovery 20:15:00         Pain/Kylee Score: Pain Rating Prior to Med Admin: 6 (10/25/2022  8:30 PM)  Pain Rating Post Med Admin: 1 (10/25/2022  8:40 PM)  Kylee Score: 10 (10/25/2022  8:15 PM)

## 2022-10-26 NOTE — PATIENT INSTRUCTIONS
Keep splint clean/dry    Non weight bearing x 2 wks    Elevate ankle above heart as much as possible

## 2022-11-08 ENCOUNTER — HOSPITAL ENCOUNTER (OUTPATIENT)
Dept: RADIOLOGY | Facility: HOSPITAL | Age: 32
Discharge: HOME | End: 2022-11-08
Attending: NURSE PRACTITIONER
Payer: MEDICAID

## 2022-11-08 ENCOUNTER — OFFICE VISIT (OUTPATIENT)
Dept: ORTHOPEDICS | Facility: CLINIC | Age: 32
End: 2022-11-08
Payer: MEDICAID

## 2022-11-08 VITALS — HEIGHT: 68 IN | WEIGHT: 188.06 LBS | BODY MASS INDEX: 28.5 KG/M2

## 2022-11-08 DIAGNOSIS — Z98.890 POST-OPERATIVE STATE: ICD-10-CM

## 2022-11-08 DIAGNOSIS — M25.572 ACUTE LEFT ANKLE PAIN: Primary | ICD-10-CM

## 2022-11-08 DIAGNOSIS — S82.892D CLOSED FRACTURE OF LEFT ANKLE WITH ROUTINE HEALING, SUBSEQUENT ENCOUNTER: Primary | ICD-10-CM

## 2022-11-08 DIAGNOSIS — M25.572 ACUTE LEFT ANKLE PAIN: ICD-10-CM

## 2022-11-08 PROCEDURE — 3008F PR BODY MASS INDEX (BMI) DOCUMENTED: ICD-10-PCS | Mod: CPTII,,, | Performed by: NURSE PRACTITIONER

## 2022-11-08 PROCEDURE — 99024 PR POST-OP FOLLOW-UP VISIT: ICD-10-PCS | Mod: ,,, | Performed by: NURSE PRACTITIONER

## 2022-11-08 PROCEDURE — 3008F BODY MASS INDEX DOCD: CPT | Mod: CPTII,,, | Performed by: NURSE PRACTITIONER

## 2022-11-08 PROCEDURE — 1160F PR REVIEW ALL MEDS BY PRESCRIBER/CLIN PHARMACIST DOCUMENTED: ICD-10-PCS | Mod: CPTII,,, | Performed by: NURSE PRACTITIONER

## 2022-11-08 PROCEDURE — 1159F PR MEDICATION LIST DOCUMENTED IN MEDICAL RECORD: ICD-10-PCS | Mod: CPTII,,, | Performed by: NURSE PRACTITIONER

## 2022-11-08 PROCEDURE — 99999 PR PBB SHADOW E&M-EST. PATIENT-LVL III: CPT | Mod: PBBFAC,,, | Performed by: NURSE PRACTITIONER

## 2022-11-08 PROCEDURE — 73610 X-RAY EXAM OF ANKLE: CPT | Mod: TC,LT

## 2022-11-08 PROCEDURE — 73610 X-RAY EXAM OF ANKLE: CPT | Mod: 26,LT,, | Performed by: RADIOLOGY

## 2022-11-08 PROCEDURE — 1160F RVW MEDS BY RX/DR IN RCRD: CPT | Mod: CPTII,,, | Performed by: NURSE PRACTITIONER

## 2022-11-08 PROCEDURE — 1159F MED LIST DOCD IN RCRD: CPT | Mod: CPTII,,, | Performed by: NURSE PRACTITIONER

## 2022-11-08 PROCEDURE — 99213 OFFICE O/P EST LOW 20 MIN: CPT | Mod: PBBFAC | Performed by: NURSE PRACTITIONER

## 2022-11-08 PROCEDURE — 99024 POSTOP FOLLOW-UP VISIT: CPT | Mod: ,,, | Performed by: NURSE PRACTITIONER

## 2022-11-08 PROCEDURE — 99999 PR PBB SHADOW E&M-EST. PATIENT-LVL III: ICD-10-PCS | Mod: PBBFAC,,, | Performed by: NURSE PRACTITIONER

## 2022-11-08 PROCEDURE — 73610 XR ANKLE COMPLETE 3 VIEW LEFT: ICD-10-PCS | Mod: 26,LT,, | Performed by: RADIOLOGY

## 2022-11-08 RX ORDER — CLINDAMYCIN HYDROCHLORIDE 300 MG/1
300 CAPSULE ORAL EVERY 8 HOURS
Qty: 21 CAPSULE | Refills: 0 | Status: SHIPPED | OUTPATIENT
Start: 2022-11-08 | End: 2022-11-15

## 2022-11-08 RX ORDER — OXYCODONE HYDROCHLORIDE 5 MG/1
5 TABLET ORAL EVERY 6 HOURS PRN
Qty: 28 TABLET | Refills: 0 | Status: SHIPPED | OUTPATIENT
Start: 2022-11-08 | End: 2022-12-01

## 2022-11-08 RX ORDER — METHOCARBAMOL 500 MG/1
500 TABLET, FILM COATED ORAL 4 TIMES DAILY
Qty: 40 TABLET | Refills: 0 | Status: SHIPPED | OUTPATIENT
Start: 2022-11-08 | End: 2022-11-18

## 2022-11-08 NOTE — PROGRESS NOTES
Mr. Alex is here today for a post-operative visit after undergoing an ORIF for his left trimalleolar fracture with fixation of the posterior lip by Dr. Cano on 10/25/2022.    Interval History:  He reports that he is doing ok.  Pain is ok, tends to have more pain at night.  He is taking pain medication.  He reports he is out of Oxycodone and Robaxin and that really helped, has only been using Motrin.  He has not started therapy.  He reports the incision it itching.  He denies fever, chills, and sweats since the time of the surgery.     Physical exam:  Dressing taken down.  Incision is clean, dry and intact.  There is mild erythema along the incision border, no drainage present.  Sutures removed without difficulty.  Steri strips applied.  He has tactile stimulation to their lower leg, he denies calf pain, there is no leg edema and their pedal pulse is palpable x 2.     RADS: Left ankle x-ray was obtained and personally reviewed by me, findings show fractures to his tibia and fibula are still seen and appears stable.  Lateral side plate and screws appear intact and no hardware failure seen.  No new fractures seen.     Assessment:  Post-op visit (2 weeks)    Plan:    ICD-10-CM ICD-9-CM   1. Closed fracture of left ankle with routine healing, subsequent encounter s/p ORIF 10/25/22  S82.892D V54.19   2. Post-operative state  Z98.890 V45.89     Current care, treatment plan, precautions, activity level/ modifications, limitations, rehabilitation exercises and proposed future treatment were discussed with the patient. We discussed the need to monitor for changes in symptoms and condition and report them to the physician.  Discussed importance of compliance with all appointments and follow up examinations.     WOUND CARE ORDERS  -Hudson was advised to keep the incision clean and dry for the next 24 hours after which he may wash the area with antibacterial soap in the shower.   -He not submerge until the incision is  completely healed  -Patient was advised to monitor wound closely and multiple times daily for any problems. Call clinic immediately or report to ED for immediate medical attention for any complications including reopening of wound, drainage, purulence, redness, streaking, odor, pain out of proportion, fever, chills, etc.   - If there are signs of infection, please call [facility] [phone] for further instructions and to make appt to be seen.   - Will give him Clindamycin 300 mg tid x 7 days for ppx.      PHYSICAL THERAPY:   - Refer to [facility].  - Weight bearing as tolerated in cam boot and please use crutches.  - Range of motion as tolerated.      PAIN MEDICATION:   - Pain medication: refill was needed, will refill OxyIR 5 mg q6h PRN and Robaxin 500 mg qid.  - Pain medication refill policy provided to patient for review, yes.    - Patient was informed a multi-modal approach is used to treat their pain.     DVT PROPHYLAXIS:   - ASA 81 mg bid x 6 weeks     FOLLOW UP:   - Patient will follow up in the clinic in 1 week for a wound check.  - X-ray of his left ankle is NOT needed.    - Future Appointments:   Future Appointments   Date Time Provider Department Center   11/15/2022 11:30 AM JUAN Davis   12/6/2022 10:45 AM JUAN [facility]           If there are any questions prior to scheduled follow up, the patient was instructed to contact the office

## 2022-11-15 ENCOUNTER — OFFICE VISIT (OUTPATIENT)
Dept: ORTHOPEDICS | Facility: CLINIC | Age: 32
End: 2022-11-15
Payer: MEDICAID

## 2022-11-15 VITALS — HEIGHT: 68 IN | BODY MASS INDEX: 28.5 KG/M2 | WEIGHT: 188.06 LBS

## 2022-11-15 DIAGNOSIS — Z51.89 VISIT FOR WOUND CHECK: Primary | ICD-10-CM

## 2022-11-15 DIAGNOSIS — S82.892D CLOSED FRACTURE OF LEFT ANKLE WITH ROUTINE HEALING, SUBSEQUENT ENCOUNTER: ICD-10-CM

## 2022-11-15 DIAGNOSIS — Z98.890 POST-OPERATIVE STATE: ICD-10-CM

## 2022-11-15 PROCEDURE — 99213 OFFICE O/P EST LOW 20 MIN: CPT | Mod: PBBFAC | Performed by: NURSE PRACTITIONER

## 2022-11-15 PROCEDURE — 99024 PR POST-OP FOLLOW-UP VISIT: ICD-10-PCS | Mod: ,,, | Performed by: NURSE PRACTITIONER

## 2022-11-15 PROCEDURE — 1159F MED LIST DOCD IN RCRD: CPT | Mod: CPTII,,, | Performed by: NURSE PRACTITIONER

## 2022-11-15 PROCEDURE — 3008F BODY MASS INDEX DOCD: CPT | Mod: CPTII,,, | Performed by: NURSE PRACTITIONER

## 2022-11-15 PROCEDURE — 1160F PR REVIEW ALL MEDS BY PRESCRIBER/CLIN PHARMACIST DOCUMENTED: ICD-10-PCS | Mod: CPTII,,, | Performed by: NURSE PRACTITIONER

## 2022-11-15 PROCEDURE — 1160F RVW MEDS BY RX/DR IN RCRD: CPT | Mod: CPTII,,, | Performed by: NURSE PRACTITIONER

## 2022-11-15 PROCEDURE — 99999 PR PBB SHADOW E&M-EST. PATIENT-LVL III: CPT | Mod: PBBFAC,,, | Performed by: NURSE PRACTITIONER

## 2022-11-15 PROCEDURE — 1159F PR MEDICATION LIST DOCUMENTED IN MEDICAL RECORD: ICD-10-PCS | Mod: CPTII,,, | Performed by: NURSE PRACTITIONER

## 2022-11-15 PROCEDURE — 3008F PR BODY MASS INDEX (BMI) DOCUMENTED: ICD-10-PCS | Mod: CPTII,,, | Performed by: NURSE PRACTITIONER

## 2022-11-15 PROCEDURE — 99024 POSTOP FOLLOW-UP VISIT: CPT | Mod: ,,, | Performed by: NURSE PRACTITIONER

## 2022-11-15 PROCEDURE — 99999 PR PBB SHADOW E&M-EST. PATIENT-LVL III: ICD-10-PCS | Mod: PBBFAC,,, | Performed by: NURSE PRACTITIONER

## 2022-11-15 NOTE — PROGRESS NOTES
Mr. Alex is here today for a post-operative visit after undergoing an ORIF for his left trimalleolar fracture with fixation of the posterior lip by Dr. Cano on 10/25/2022.    Interval History:  He reports that he is doing ok.  Pain is ok, tends to have more pain at night.  He is taking pain medication.  He admits he did not follow dosing schedule for his antibiotics, missed 2 days.  He feels his incision are better.  He denies fever, chills, and sweats since the time of the surgery.     Physical exam:  Incision is open to air and healing.  There is less erythema today when compared to last week.  There is no drainage and no signs of infection.  He has tactile stimulation to their lower leg, he denies calf pain, there is no leg edema and their pedal pulse is palpable x 2.     RADS: none     Assessment:  Post-op visit (3 weeks)    Plan:    ICD-10-CM ICD-9-CM   1. Visit for wound check  Z51.89 V58.89   2. Closed fracture of left ankle with routine healing, subsequent encounter s/p ORIF 10/25/22  S82.892D V54.19   3. Post-operative state  Z98.890 V45.89       Current care, treatment plan, precautions, activity level/ modifications, limitations, rehabilitation exercises and proposed future treatment were discussed with the patient. We discussed the need to monitor for changes in symptoms and condition and report them to the physician.  Discussed importance of compliance with all appointments and follow up examinations.     WOUND CARE ORDERS  -Hudson was advised to keep the incision clean and dry for the next 24 hours after which he may wash the area with antibacterial soap in the shower.   -He not submerge until the incision is completely healed  -Patient was advised to monitor wound closely and multiple times daily for any problems. Call clinic immediately or report to ED for immediate medical attention for any complications including reopening of wound, drainage, purulence, redness, streaking, odor, pain out of  proportion, fever, chills, etc.   - If there are signs of infection, please call Ortho Clinic [phone] for further instructions and to make appt to be seen.   - Recommend he complete Clindamycin dosing.      PHYSICAL THERAPY:   - Continue with [facility].  - Weight bearing as tolerated in cam boot and please use crutches.  - Range of motion as tolerated.      PAIN MEDICATION:   - Pain medication: refill was not needed, he will continue OxyIR 5 mg q6h PRN and Robaxin 500 mg qid.  - Pain medication refill policy provided to patient for review, yes.    - Patient was informed a multi-modal approach is used to treat their pain.     DVT PROPHYLAXIS:   - ASA 81 mg bid x 6 weeks     FOLLOW UP:   - Patient will follow up in the clinic in 3 week for a wound check.  - X-ray of his left ankle is needed.  - May consider transitioning into a lace up ankle brace at next visit.    - Future Appointments:   Future Appointments   Date Time Provider Department Center   11/16/2022  2:00 PM IVANIA Granda   12/6/2022 10:45 AM [facility]           If there are any questions prior to scheduled follow up, the patient was instructed to contact the office

## 2022-11-16 ENCOUNTER — CLINICAL SUPPORT (OUTPATIENT)
Dept: REHABILITATION | Facility: HOSPITAL | Age: 32
End: 2022-11-16
Payer: MEDICAID

## 2022-11-16 DIAGNOSIS — M25.672 DECREASED RANGE OF MOTION OF LEFT ANKLE: ICD-10-CM

## 2022-11-16 DIAGNOSIS — S82.892D CLOSED FRACTURE OF LEFT ANKLE WITH ROUTINE HEALING, SUBSEQUENT ENCOUNTER: ICD-10-CM

## 2022-11-16 PROCEDURE — 97161 PT EVAL LOW COMPLEX 20 MIN: CPT | Mod: PN

## 2022-11-16 PROCEDURE — 97110 THERAPEUTIC EXERCISES: CPT | Mod: PN

## 2022-11-16 NOTE — PLAN OF CARE
[facility]   Physical Therapy Initial Evaluation     Date: 11/16/2022   Name: Hudson Alex  Clinic Number: [id number]    Therapy Diagnosis:   Encounter Diagnosis   Name Primary?    Closed fracture of left ankle with routine healing, subsequent encounter s/p ORIF 10/25/22      Physician: Brody Tirado NP    Physician Orders: PT Eval and Treat   Medical Diagnosis from Referral: S82.892D (ICD-10-CM) - Closed fracture of left ankle with routine healing, subsequent encounter   Evaluation Date: 11/16/2022  Authorization Period Expiration: 12/31/2022  Plan of Care Expiration: 12/30/2022  Progress Note Due: 10th visit  Visit # / Visits authorized: 1/ 1   FOTO: 1/3    Precautions: Standard  WBAT with crutches    Time In: 2:05pm  Time Out: 2:50pm  Total Appointment Time (timed & untimed codes): 50 minutes      SUBJECTIVE     Date of onset: 10/15/2022 broke the ankle at home from a fall.  10/25/2022    History of current condition - Dharmesh reports: broke his ankle.  He has been using 2 crutches or 1 crutch to walk with.      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      Medical History:   No past medical history on file.    Surgical History:   Hudson Alex  has a past surgical history that includes [place] tooth extraction; Hernia repair; and Open reduction and internal fixation (ORIF) of injury of ankle (Left, 10/25/2022).    Medications:   Hudson has a current  medication list which includes the following prescription(s): acetaminophen, aspirin, gabapentin, ibuprofen, methadone, methocarbamol, ondansetron, oxycodone, pantoprazole, pregabalin, and sertraline.    Allergies:   Review of patient's allergies indicates:   Allergen Reactions    Sulfa (sulfonamide antibiotics) Anaphylaxis          OBJECTIVE     Gait Observation:  Came in ambulating NWBing with CAM boot on, using 2 crutches.        L ankle: figure 8 23 inches, around malleoli 10.5 inches     Strength:   B hips grossly 5/5  B knees grossly 5/5  R ankle grossly 5/5  L ankle grossly 4/5        Range of Motion:  B hips WNL in all directions  B knees WNL in all directions       Ankle/Foot Right Left Pain/Dysfunction with Movement   AROM/PROM      dorsiflexion  10  5    plantarflexion  45  38    inversion  22  8 Left ankle inversion painful   eversion  15  10            Limitation/Restriction for FOTO Ankle Survey    Therapist reviewed FOTO scores for Hudson Alex on 11/16/2022.   FOTO documents entered into [employer] - see Media section.    Limitation Score: 48%         TREATMENT     Total Treatment time (time-based codes) separate from Evaluation: 10 minutes      Dharmesh received the treatments listed below:      therapeutic exercises to develop strength, endurance, ROM, and flexibility for 20 minutes including:  Seated calf stretch  Ankle pumps  Ankle circles  Seated DF and seated PF  Ankle DF stretch on stairs    Dharmesh participated in gait training to improve functional mobility and safety for 10  minutes, including:  Gait training with 2 crutches (crutches adjusted to correct height), WBAT with CAM boot on and using 2 crutches.          PATIENT EDUCATION AND HOME EXERCISES     Education provided:   - HEP and plan of care    Written Home Exercises Provided: yes. Exercises were reviewed and Dharmesh was able to demonstrate them prior to the end of the session.  Dharmesh demonstrated good  understanding of the education  provided. See EMR under Patient Instructions for exercises provided during therapy sessions.    ASSESSMENT     Hudson is a 32 y.o. male referred to outpatient Physical Therapy with a medical diagnosis of L ankle fracture. Patient presents with decreased L ankle AROM, decreased L ankle strength, pain in the ankle, swelling in the ankle, and difficulty with gait.     Patient prognosis is Good.   Patient will benefit from skilled outpatient Physical Therapy to address the deficits stated above and in the chart below, provide patient /family education, and to maximize patientt's level of independence.     Plan of care discussed with patient: Yes  Patient's spiritual, cultural and educational needs considered and patient is agreeable to the plan of care and goals as stated below:     Anticipated Barriers for therapy: none    Medical Necessity is demonstrated by the following  History  Co-morbidities and personal factors that may impact the plan of care Co-morbidities:   See above PMHx    Personal Factors:   no deficits     low   Examination  Body Structures and Functions, activity limitations and participation restrictions that may impact the plan of care Body Regions:   lower extremities    Body Systems:    ROM  strength  balance  gait    Participation Restrictions:   none    Activity limitations:   Learning and applying knowledge  no deficits    General Tasks and Commands  no deficits    Communication  no deficits    Mobility  walking    Self care  washing oneself (bathing, drying, washing hands)  dressing    Domestic Life  doing house work (cleaning house, washing dishes, laundry)    Interactions/Relationships  no deficits    Life Areas  employment    Community and Social Life  recreation and leisure         moderate   Clinical Presentation stable and uncomplicated low   Decision Making/ Complexity Score: low     Goals:  Short Term Goals:   Pt will be 50% independent with HEP    Long Term Goals: 6 weeks   Pt will be  100% independent with HEP.  Pt will be able to walk with normalized gait pattern and least restrictive AD.  Pt will have full AROM of the L ankle to have normalized gait pattern.  Pt will be able to perform all of his usual household activities without difficulty     PLAN   Plan of care Certification: 11/16/2022 to 12/30/2022.    Outpatient Physical Therapy 2 times weekly for 6 weeks to include the following interventions: Gait Training, Manual Therapy, Moist Heat/ Ice, Neuromuscular Re-ed, Patient Education, Self Care, Therapeutic Activities, Therapeutic Exercise, and FDN .     Sulema Jenkins, PT      I CERTIFY THE NEED FOR THESE SERVICES FURNISHED UNDER THIS PLAN OF TREATMENT AND WHILE UNDER MY CARE   Physician's comments:     Physician's Signature: ___________________________________________________

## 2022-12-01 DIAGNOSIS — S82.892D CLOSED FRACTURE OF LEFT ANKLE WITH ROUTINE HEALING, SUBSEQUENT ENCOUNTER: ICD-10-CM

## 2022-12-01 RX ORDER — OXYCODONE HYDROCHLORIDE 5 MG/1
5 TABLET ORAL EVERY 8 HOURS PRN
Qty: 21 TABLET | Refills: 0 | Status: SHIPPED | OUTPATIENT
Start: 2022-12-01

## 2022-12-01 RX ORDER — METHOCARBAMOL 500 MG/1
500 TABLET, FILM COATED ORAL 3 TIMES DAILY
Qty: 30 TABLET | Refills: 0 | Status: SHIPPED | OUTPATIENT
Start: 2022-12-01 | End: 2022-12-11

## 2022-12-05 ENCOUNTER — CLINICAL SUPPORT (OUTPATIENT)
Dept: REHABILITATION | Facility: HOSPITAL | Age: 32
End: 2022-12-05
Payer: MEDICAID

## 2022-12-05 DIAGNOSIS — M25.672 DECREASED RANGE OF MOTION OF LEFT ANKLE: Primary | ICD-10-CM

## 2022-12-05 PROCEDURE — 97110 THERAPEUTIC EXERCISES: CPT | Mod: PN

## 2022-12-05 NOTE — PROGRESS NOTES
"[facility]   Physical Therapy Treatment Note     Name: Hudson Alex  Clinic Number: [id number]    Therapy Diagnosis:   Encounter Diagnosis   Name Primary?    Decreased range of motion of left ankle Yes     Physician: Brody Tirado NP    Visit Date: 12/5/2022  Physician Orders: PT Eval and Treat   Medical Diagnosis from Referral: S82.892D (ICD-10-CM) - Closed fracture of left ankle with routine healing, subsequent encounter   Evaluation Date: 11/16/2022  Authorization Period Expiration: 12/31/2022  Plan of Care Expiration: 12/30/2022  Progress Note Due: 10th visit  Visit # / Visits authorized: 1/ 1   FOTO: 1/3     Precautions: Standard  WBAT with crutches    PTA Visit #: --/5     Time In: 1320  Time Out: 1358  Total Billable Time: 38 minutes    SUBJECTIVE     Pt reports: that he has been working on his walking at home.  He had been sick and has been having transportation issues and that's why he hasn't been to therapy in a bit.   He was compliant with home exercise program.  Response to previous treatment: walking improved  Functional change: able to have normalized gait pattern without any type of AD    Pain: 6/10  Location: left ankles     OBJECTIVE     Objective Measures updated at progress report unless specified.     Treatment     Dharmesh received the treatments listed below:      therapeutic exercises to develop strength, endurance, ROM, flexibility, and posture for 38 minutes including:  Bike 6' for Range of motion and stretching  Shuttle DL 7B 3', SL left lower extremity only   Standing Heel raises 20x  Single Leg heel raise left lower extremity 20x  Sit to stand 20" w/14# SB 2 x 10  DF stretch @ wall 10x  Wedge calf stretch 20" x 5  Step downs 6" fwd/lat 20x 2  SLS w/ball toss@ trampoline red ball 1'  Tandem w/ball toss @ trampoline red ball 1'      Seated calf stretch  Ankle pumps  Ankle circles  Seated DF and seated PF  Ankle DF stretch on stairs            Patient " Education and Home Exercises     Home Exercises Provided and Patient Education Provided     Education provided:   - HEP and plan of care    Written Home Exercises Provided: yes. Exercises were reviewed and Dharmesh was able to demonstrate them prior to the end of the session.  Dharmesh demonstrated good  understanding of the education provided. See EMR under Patient Instructions for exercises provided during therapy sessions  12/05/2022-issued new HEP  ASSESSMENT     Dharmesh presents with improved gait pattern but pain continues in the L ankle.  He has improved gait pattern.  He is lacking full DF AROM in the L ankle.  He is progressing well and continue with BLE strengthening and L ankle stretching to achieve full AROM.     Dharmesh Is progressing well towards his goals.   Pt prognosis is Good.     Pt will continue to benefit from skilled outpatient physical therapy to address the deficits listed in the problem list box on initial evaluation, provide pt/family education and to maximize pt's level of independence in the home and community environment.     Pt's spiritual, cultural and educational needs considered and pt agreeable to plan of care and goals.     Anticipated barriers to physical therapy: none    Short Term Goals:   Pt will be 50% independent with HEP. Progressing     Long Term Goals: 6 weeks   Pt will be 100% independent with HEP. Progressing  Pt will be able to walk with normalized gait pattern and least restrictive AD. Progressing  Pt will have full AROM of the L ankle to have normalized gait pattern. Progressing  Pt will be able to perform all of his usual household activities without difficulty. Progressing      PLAN   Plan of care Certification: 11/16/2022 to 12/30/2022.     Outpatient Physical Therapy 2 times weekly for 6 weeks to include the following interventions: Gait Training, Manual Therapy, Moist Heat/ Ice, Neuromuscular Re-ed, Patient Education, Self Care, Therapeutic Activities, Therapeutic Exercise,  and FDN .     Sulema Jenkins, PT

## 2022-12-06 ENCOUNTER — HOSPITAL ENCOUNTER (OUTPATIENT)
Dept: RADIOLOGY | Facility: HOSPITAL | Age: 32
Discharge: HOME | End: 2022-12-06
Attending: NURSE PRACTITIONER
Payer: MEDICAID

## 2022-12-06 ENCOUNTER — OFFICE VISIT (OUTPATIENT)
Dept: ORTHOPEDICS | Facility: CLINIC | Age: 32
End: 2022-12-06
Payer: MEDICAID

## 2022-12-06 VITALS — WEIGHT: 188.06 LBS | BODY MASS INDEX: 28.5 KG/M2 | HEIGHT: 68 IN

## 2022-12-06 DIAGNOSIS — S82.892D CLOSED FRACTURE OF LEFT ANKLE WITH ROUTINE HEALING, SUBSEQUENT ENCOUNTER: Primary | ICD-10-CM

## 2022-12-06 DIAGNOSIS — Z98.890 POST-OPERATIVE STATE: ICD-10-CM

## 2022-12-06 DIAGNOSIS — M25.572 ACUTE LEFT ANKLE PAIN: Primary | ICD-10-CM

## 2022-12-06 DIAGNOSIS — M25.572 ACUTE LEFT ANKLE PAIN: ICD-10-CM

## 2022-12-06 PROCEDURE — 73610 X-RAY EXAM OF ANKLE: CPT | Mod: 26,LT,, | Performed by: RADIOLOGY

## 2022-12-06 PROCEDURE — 99024 POSTOP FOLLOW-UP VISIT: CPT | Mod: ,,, | Performed by: NURSE PRACTITIONER

## 2022-12-06 PROCEDURE — 1159F MED LIST DOCD IN RCRD: CPT | Mod: CPTII,,, | Performed by: NURSE PRACTITIONER

## 2022-12-06 PROCEDURE — 99213 OFFICE O/P EST LOW 20 MIN: CPT | Mod: PBBFAC | Performed by: NURSE PRACTITIONER

## 2022-12-06 PROCEDURE — 3008F BODY MASS INDEX DOCD: CPT | Mod: CPTII,,, | Performed by: NURSE PRACTITIONER

## 2022-12-06 PROCEDURE — 1159F PR MEDICATION LIST DOCUMENTED IN MEDICAL RECORD: ICD-10-PCS | Mod: CPTII,,, | Performed by: NURSE PRACTITIONER

## 2022-12-06 PROCEDURE — 99024 PR POST-OP FOLLOW-UP VISIT: ICD-10-PCS | Mod: ,,, | Performed by: NURSE PRACTITIONER

## 2022-12-06 PROCEDURE — 1160F RVW MEDS BY RX/DR IN RCRD: CPT | Mod: CPTII,,, | Performed by: NURSE PRACTITIONER

## 2022-12-06 PROCEDURE — 3008F PR BODY MASS INDEX (BMI) DOCUMENTED: ICD-10-PCS | Mod: CPTII,,, | Performed by: NURSE PRACTITIONER

## 2022-12-06 PROCEDURE — 99999 PR PBB SHADOW E&M-EST. PATIENT-LVL III: CPT | Mod: PBBFAC,,, | Performed by: NURSE PRACTITIONER

## 2022-12-06 PROCEDURE — 73610 XR ANKLE COMPLETE 3 VIEW LEFT: ICD-10-PCS | Mod: 26,LT,, | Performed by: RADIOLOGY

## 2022-12-06 PROCEDURE — 99999 PR PBB SHADOW E&M-EST. PATIENT-LVL III: ICD-10-PCS | Mod: PBBFAC,,, | Performed by: NURSE PRACTITIONER

## 2022-12-06 PROCEDURE — 1160F PR REVIEW ALL MEDS BY PRESCRIBER/CLIN PHARMACIST DOCUMENTED: ICD-10-PCS | Mod: CPTII,,, | Performed by: NURSE PRACTITIONER

## 2022-12-06 PROCEDURE — 73610 X-RAY EXAM OF ANKLE: CPT | Mod: TC,LT

## 2022-12-06 RX ORDER — CLINDAMYCIN HYDROCHLORIDE 300 MG/1
300 CAPSULE ORAL EVERY 8 HOURS
Qty: 21 CAPSULE | Refills: 0 | Status: SHIPPED | OUTPATIENT
Start: 2022-12-06 | End: 2022-12-13

## 2022-12-06 NOTE — PROGRESS NOTES
Mr. Alex is here today for a post-operative visit after undergoing an ORIF for his left trimalleolar fracture with fixation of the posterior lip by Dr. Cano on 10/25/2022.    Interval History:  He reports that he is doing ok.  He reports 2 days ago he noticed some pus come in from the medial side of his ankle over the incision.  States his girlfriend is a nurse and thought this may have been an ingrown hair.  Patient denies any fever chills or pain with rotation of his ankle.  He states he decided to come out of the Cam boot as he felt the boot was placing pressure along the ankle.  He feels more comfortable with his compression sleeve in regular shoes.  He denies any foot or toe numbness.  He is taking pain medication.   He denies fever, chills, and sweats since the time of the surgery.     Physical exam:  Incision is open to air.  Lateral side of the ankle incision is healing without complication on the medial side that is mild erythema and what appears to be fluctuance.  When areas squeezed no fluid is expressed.  He has normal range of motion of his left ankle.  He can flex and curl his toes without complication.  Range of motion of the ankle is approximately 20° in dorsiflexion and plantar flexion and 15° in inversion and eversion.   He has tactile stimulation to their lower leg, he denies calf pain, there is no leg edema and their pedal pulse is palpable x 2.  See photos below.          Above photo(s) was/were taken with verbal permission of patient and/or their representative.  The purpose of photo(s) is to aid in medical treatment plan.      RADS:  X-ray of the left ankle was obtained and personally reviewed by me.  His lateral side plate and screw construct appeared to be in proper position and alignment.  Fracture appears to be stable.  No new fracture seen.  Ankle mortise is symmetrical.    Assessment:  Post-op visit (6 weeks)    Plan:    ICD-10-CM ICD-9-CM   1. Closed fracture of left ankle with  routine healing, subsequent encounter s/p ORIF 10/25/22  S82.892D V54.19   2. Post-operative state  Z98.890 V45.89       Current care, treatment plan, precautions, activity level/ modifications, limitations, rehabilitation exercises and proposed future treatment were discussed with the patient. We discussed the need to monitor for changes in symptoms and condition and report them to the physician.  Discussed importance of compliance with all appointments and follow up examinations.     WOUND CARE ORDERS  -Hudson was advised to keep the incision clean and dry.  He is the pain to the area with Betadine daily.  -I will place him on clindamycin 300 mg 3 times a day for 1 week for prophylactics.  -I will check an ESR and CRP.  -I had Dr. Cano come in to evaluate the patient he feels the area of concern is likely a stitch abscess but agrees with the above recommendations.      PHYSICAL THERAPY:   - Continue with [facility].  - Weight bearing as tolerated.  - Range of motion as tolerated.      PAIN MEDICATION:   - Pain medication: refill was not needed, he will continue OxyIR 5 mg q6h PRN and Robaxin 500 mg qid.  - Pain medication refill policy provided to patient for review, yes.    - Patient was informed a multi-modal approach is used to treat their pain.      FOLLOW UP:   - Patient will follow up in the clinic in 1 week for a wound check.  - X-ray of his left ankle is NOT needed.      - Future Appointments:   Future Appointments   Date Time Provider Department Center   12/7/2022  2:00 PM Sulema Jenkins, PT [facility] [place]   12/12/2022  2:00 PM Sulema Jenkins, PT [facility] [place]   12/14/2022  2:30 PM JUAN [facility]   12/19/2022  2:00 PM Sulema Jenkins, PT [facility] [place]   12/23/2022  2:00 PM Sulema Jenkins, PT [facility] [place]   12/27/2022  2:00 PM Ambrose Thapa PTA [facility] [place]   12/30/2022  2:00 PM Sulema Jenkins, PT [facility] [place]           If there are any questions prior to scheduled follow up, the patient was instructed to contact the office    Patient called and notified his ESR and CRP are normal.  Lab Results   Component Value Date    SEDRATE 16 12/06/2022     Lab Results   Component Value Date    CRP 3.5 12/06/2022

## 2022-12-14 ENCOUNTER — PATIENT MESSAGE (OUTPATIENT)
Dept: ORTHOPEDICS | Facility: CLINIC | Age: 32
End: 2022-12-14
Payer: MEDICAID

## 2022-12-14 ENCOUNTER — TELEPHONE (OUTPATIENT)
Dept: ORTHOPEDICS | Facility: CLINIC | Age: 32
End: 2022-12-14
Payer: MEDICAID

## 2022-12-14 NOTE — TELEPHONE ENCOUNTER
Called and explain to pt Brody will also be leaving the clinic today due to the bad weather. Informed pt he can send a picture through his My Chart but I will keep his appt with Brody on Friday in case Brody still would like to see him . Pt verbally understood.

## 2022-12-15 ENCOUNTER — PATIENT MESSAGE (OUTPATIENT)
Dept: ORTHOPEDICS | Facility: CLINIC | Age: 32
End: 2022-12-15
Payer: MEDICAID

## 2022-12-16 ENCOUNTER — DOCUMENTATION ONLY (OUTPATIENT)
Dept: REHABILITATION | Facility: HOSPITAL | Age: 32
End: 2022-12-16
Payer: MEDICAID

## 2022-12-16 ENCOUNTER — OFFICE VISIT (OUTPATIENT)
Dept: ORTHOPEDICS | Facility: CLINIC | Age: 32
End: 2022-12-16
Payer: MEDICAID

## 2022-12-16 DIAGNOSIS — S82.892D CLOSED FRACTURE OF LEFT ANKLE WITH ROUTINE HEALING, SUBSEQUENT ENCOUNTER: Primary | ICD-10-CM

## 2022-12-16 DIAGNOSIS — Z98.890 POST-OPERATIVE STATE: ICD-10-CM

## 2022-12-16 PROCEDURE — 1159F PR MEDICATION LIST DOCUMENTED IN MEDICAL RECORD: ICD-10-PCS | Mod: CPTII,95,, | Performed by: NURSE PRACTITIONER

## 2022-12-16 PROCEDURE — 99024 PR POST-OP FOLLOW-UP VISIT: ICD-10-PCS | Mod: 95,,, | Performed by: NURSE PRACTITIONER

## 2022-12-16 PROCEDURE — 99024 POSTOP FOLLOW-UP VISIT: CPT | Mod: 95,,, | Performed by: NURSE PRACTITIONER

## 2022-12-16 PROCEDURE — 1160F RVW MEDS BY RX/DR IN RCRD: CPT | Mod: CPTII,95,, | Performed by: NURSE PRACTITIONER

## 2022-12-16 PROCEDURE — 1160F PR REVIEW ALL MEDS BY PRESCRIBER/CLIN PHARMACIST DOCUMENTED: ICD-10-PCS | Mod: CPTII,95,, | Performed by: NURSE PRACTITIONER

## 2022-12-16 PROCEDURE — 1159F MED LIST DOCD IN RCRD: CPT | Mod: CPTII,95,, | Performed by: NURSE PRACTITIONER

## 2022-12-16 RX ORDER — CLINDAMYCIN HYDROCHLORIDE 300 MG/1
300 CAPSULE ORAL EVERY 8 HOURS
Qty: 21 CAPSULE | Refills: 0 | Status: SHIPPED | OUTPATIENT
Start: 2022-12-16 | End: 2022-12-23

## 2022-12-16 NOTE — PROGRESS NOTES
Telemedicine/Virtual Visit Documentation:     The patient location is: home    The chief complaint leading to consultation is: see HPI    HPI:  Patient is a 32-year-old male who is status post ORIF for his left trimalleolar ankle fracture with fixation by Dr. Cano on October 25, 2022.  He was last seen in clinic on December 6, 2022 for wound check.  Had some mild erythema on the medial aspect of his ankle incision and patient was given clindamycin 300 mg 3 times a day for a week for prophylactic treatment and his inflammatory markers were checked.  Inflammatory markers were unremarkable.  He was instructed to follow up in 1 week time for wound check.  Unfortunately the patient became ill with COVID, he lives in [place] and switched as the appointment to a virtual visit.  Patient reports he complete his course antibiotics.  He has no drainage from his incisional wound.  He still has mild erythema on the medial side with some mild tenderness.  But no pain with range of motion of the ankle.  Is able to ambulate without pain or difficulties.    Physical exam:  Patient uploaded pictures of his incisional wound, see media for records.  According to the images reviewed patient shows improving erythema along the incision line.  There appears to be no drainage.  He has normal range of motion of his left ankle.    Recommendations:  1. Closed fracture of left ankle with routine healing, subsequent encounter    - clindamycin (CLEOCIN) 300 MG capsule; Take 1 capsule (300 mg total) by mouth every 8 (eight) hours. for 7 days  Dispense: 21 capsule; Refill: 0    2. Post-operative state    I will treat him for another week with course clindamycin and advised that he take lactobacillus to prevent diarrhea.  In the meantime I recommend that he see his PCP as he reports he tested positive for COVID in the last 24 hours.    His pain is currently controlled with previously prescribed medications we will continue course as  planned.    He will follow up with me in the next 2 weeks for a repeat wound check.  In the meantime he will upload pictures over the next 1-2 weeks.        VISIT TYPE X   Virtual visit with synchronous audio and video x   Telephone E/M service: conducted via a two way audio call      Total time spent with patient: see X isabel on chart below.   More than half of the time was spent counseling or coordinating care including prognosis, differential diagnosis, risks and benefits of treatment, instructions, compliance risk reductions     VIDEO EST MINUTES X Min   29224 5     67848 10     52920 15 x    99214 25     33082 40     VIDEO NEW      30442 10     42398 20     81190 30     53734 45     78343 60     PHONE      31016 5-10     81496 11-20     46559 21-30

## 2022-12-16 NOTE — PROGRESS NOTES
Called patient about missed visit.  He reports that he has covid and to cancel his 12/19 appointment and will try to make his 12/23/2022 appointment.

## 2022-12-22 ENCOUNTER — DOCUMENTATION ONLY (OUTPATIENT)
Dept: REHABILITATION | Facility: HOSPITAL | Age: 32
End: 2022-12-22
Payer: MEDICAID

## 2022-12-22 NOTE — PROGRESS NOTES
Patient is still testing positive for Covid and will need to cancel his appointment tomorrow at 2pm.   He should be here for his 2pm appointment on 12/30.

## 2022-12-27 ENCOUNTER — DOCUMENTATION ONLY (OUTPATIENT)
Dept: REHABILITATION | Facility: HOSPITAL | Age: 32
End: 2022-12-27

## 2022-12-27 NOTE — PROGRESS NOTES
Called patient regarding appointment today. Patient stated gradually recovering from Covid but is continuing to have nausea and uneasiness with activity. Patient stated each day he is feeling better and planned to keep upcoming appointment for therapy on Friday with the intent on coming in if he was doing better. Patient stated he would make sure to call if he cannot come in.

## 2023-01-04 ENCOUNTER — TELEPHONE (OUTPATIENT)
Dept: ORTHOPEDICS | Facility: CLINIC | Age: 33
End: 2023-01-04
Payer: MEDICAID

## 2023-01-04 ENCOUNTER — PATIENT MESSAGE (OUTPATIENT)
Dept: ORTHOPEDICS | Facility: CLINIC | Age: 33
End: 2023-01-04
Payer: MEDICAID

## 2023-01-04 NOTE — TELEPHONE ENCOUNTER
I spoke with patient advised him I reviewed the pictures he uploaded in his record.  Erythema along his ankle incision has improved from the last photos reviewed.  He has completed his course antibiotics.  He reports the ankle feels better and he feels better.  Asked the patient to come back in for clinic visit in 1 month's time at which time we will do an x-ray.  Patient verbalized understanding.

## 2023-01-11 ENCOUNTER — PATIENT MESSAGE (OUTPATIENT)
Dept: ORTHOPEDICS | Facility: CLINIC | Age: 33
End: 2023-01-11
Payer: MEDICAID

## 2023-02-02 ENCOUNTER — PATIENT MESSAGE (OUTPATIENT)
Dept: ORTHOPEDICS | Facility: CLINIC | Age: 33
End: 2023-02-02
Payer: MEDICAID

## 2023-02-02 ENCOUNTER — TELEPHONE (OUTPATIENT)
Dept: ORTHOPEDICS | Facility: CLINIC | Age: 33
End: 2023-02-02
Payer: MEDICAID

## 2023-02-02 DIAGNOSIS — M25.572 ACUTE LEFT ANKLE PAIN: Primary | ICD-10-CM

## 2023-02-02 NOTE — TELEPHONE ENCOUNTER
Called and informed pt his x-ray appt has been scheduled for today @ 3:15 pm.  Also informed pt I will speak with Brody for the approval of having a virtual appt instead of coming in to clinic. Pt verbally understood.

## 2023-02-03 ENCOUNTER — TELEPHONE (OUTPATIENT)
Dept: ORTHOPEDICS | Facility: CLINIC | Age: 33
End: 2023-02-03
Payer: MEDICAID

## 2023-03-02 ENCOUNTER — TELEPHONE (OUTPATIENT)
Dept: ORTHOPEDICS | Facility: CLINIC | Age: 33
End: 2023-03-02
Payer: MEDICAID

## 2023-03-02 NOTE — TELEPHONE ENCOUNTER
Called and rescheduled pt's appt with Brody in a month as requested. Scheduled pt on 04/17 @ 4 pm  and X-rays prior to his appt on 04/14 @ 1 pm. Pt was satisfied with new appt date/time.

## 2023-03-04 ENCOUNTER — HOSPITAL ENCOUNTER (EMERGENCY)
Facility: HOSPITAL | Age: 33
Discharge: HOME | End: 2023-03-04
Attending: EMERGENCY MEDICINE
Payer: MEDICAID

## 2023-03-04 VITALS
TEMPERATURE: 98 F | HEART RATE: 55 BPM | WEIGHT: 190.38 LBS | BODY MASS INDEX: 28.95 KG/M2 | DIASTOLIC BLOOD PRESSURE: 84 MMHG | SYSTOLIC BLOOD PRESSURE: 134 MMHG | OXYGEN SATURATION: 99 % | RESPIRATION RATE: 20 BRPM
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
## 2023-03-04 DIAGNOSIS — M79.606 LEG PAIN: ICD-10-CM

## 2023-03-04 PROCEDURE — 99284 EMERGENCY DEPT VISIT MOD MDM: CPT | Mod: 25

## 2023-03-04 NOTE — ED PROVIDER NOTES
Encounter Date: 3/4/2023       History     Chief Complaint   Patient presents with    Fall     Fell down stairs today while trying to prevent his dog from falling. Fell down multiple steps, hit his head, no loss of consciousness.     32-year-old male with complaint of facial pain and headache and right lower leg pain after he tripped down steps and struck his head.  Patient reports that he did not get knocked unconscious but he is feeling little bit dizzy.  Patient reports taking methadone daily any took methadone earlier today. Denies neck pain.        Review of patient's allergies indicates:   Allergen Reactions    Sulfa (sulfonamide antibiotics) Anaphylaxis     History reviewed. No pertinent past medical history.  Past Surgical History:   Procedure Laterality Date    HERNIA REPAIR      OPEN REDUCTION AND INTERNAL FIXATION (ORIF) OF INJURY OF ANKLE Left 10/25/2022    Procedure: ORIF, ANKLE (TRIMAL);  Surgeon: Hernando Cano MD;  Location: [facility] OR [address] [place] [address];  Service: Orthopedics;  Laterality: Left;  single shot block    WISDOM TOOTH EXTRACTION       History reviewed. No pertinent family history.  Social History     Tobacco Use    Smoking status: Former     Types: Cigarettes, Vaping with nicotine     Quit date: 2022     Years since quittin.4   Substance Use Topics    Alcohol use: Never    Drug use: Not Currently     Types: Benzodiazepines, Opium     Review of Systems   Constitutional:  Negative for fever.   HENT:  Negative for sore throat.    Respiratory:  Negative for shortness of breath.    Cardiovascular:  Negative for chest pain.   Gastrointestinal:  Negative for nausea.   Genitourinary:  Negative for dysuria.   Musculoskeletal:  Negative for back pain.        Right leg pain    Skin:  Negative for rash.   Neurological:  Positive for headaches. Negative for weakness.   Hematological:  Does not bruise/bleed easily.     Physical Exam     Initial Vitals [23 1633]   BP Pulse Resp Temp SpO2    134/84 (!) 55 20 97.5 °F (36.4 °C) 99 %      MAP       --         Physical Exam    Nursing note and vitals reviewed.  Constitutional: He appears well-developed and well-nourished.   HENT:   Head: Normocephalic.   Abrasion to bridge of nose, no septal hematoma, abrasion to left upper inner lip, no laceration to lip   Eyes: Conjunctivae are normal. Pupils are equal, round, and reactive to light.   Neck: Neck supple.   Normal range of motion.  Cardiovascular:  Normal rate, regular rhythm, normal heart sounds and intact distal pulses.           Pulmonary/Chest: Breath sounds normal.   Abdominal: Abdomen is soft. There is no rebound and no guarding.   Musculoskeletal:         General: Normal range of motion.      Cervical back: Normal range of motion and neck supple.      Comments: No spine tenderness, full ROM X 4, 5/5 X 4, mild tenderness right distal fibula region but no ankle tenderness, ambulatory without difficulty     Neurological: He is alert and oriented to person, place, and time.   Skin: Skin is warm and dry.   Psychiatric: He has a normal mood and affect. His behavior is normal. Thought content normal.       ED Course   Procedures  Labs Reviewed - No data to display       Imaging Results              CT Head Without Contrast (Final result)  Result time 03/04/23 17:37:57      Final result by Jayro Salinas MD (03/04/23 17:37:57)                   Impression:      No acute abnormality.    All CT scans   are performed using dose optimization techniques including the following: automated exposure control; adjustment of the mA and/or kV; use of iterative reconstruction technique.  Dose modulation was employed for ALARA by means of: Automated exposure control; adjustment of the mA and/or kV according to patient size (this includes techniques or standardized protocols for targeted exams where dose is matched to indication/reason for exam; i.e. extremities or head); and/or use of iterative reconstructive  technique.      Electronically signed by: Jayro Salinas  Date:    03/04/2023  Time:    17:37               Narrative:    EXAMINATION:  CT HEAD WITHOUT CONTRAST    CLINICAL HISTORY:  facial trauma;    TECHNIQUE:  Low dose axial CT images obtained throughout the head without intravenous contrast. Sagittal and coronal reconstructions were performed.    COMPARISON:  None.    FINDINGS:  Intracranial compartment:    Ventricles and sulci are normal in size for age without evidence of hydrocephalus. No extra-axial blood or fluid collections.    No parenchymal mass, hemorrhage, edema or major vascular distribution infarct.    Skull/extracranial contents (limited evaluation): No fracture. Mastoid air cells and paranasal sinuses are essentially clear.                                       X-Ray Tibia Fibula 2 View Right (Final result)  Result time 03/04/23 17:05:11      Final result by Jayro Salinas MD (03/04/23 17:05:11)                   Impression:      As above      Electronically signed by: Jayro Salinas  Date:    03/04/2023  Time:    17:05               Narrative:    EXAMINATION:  XR TIBIA FIBULA 2 VIEW RIGHT    CLINICAL HISTORY:  XR TIBIA FIBULA 2 VIEW RIGHTPain in leg, unspecified    COMPARISON:  None    FINDINGS:  Multiple radiographic views  were obtained.    No evidence of acute fracture or dislocation.  Prominent vascular channel in the proximal tibia of uncertain clinical significance.  Recommend follow-up non emergent MRI of the tib fib for further evaluation.                                       Medications - No data to display  Medical Decision Making:   Initial Assessment:   Patient presents the ER for evaluation of leg pain and head pain after a fall  Clinical Tests:   Radiological Study: Ordered and Reviewed  ED Management:  No acute abnormalities noted on imaging.  Patient to follow-up with PCP and return to the ER for any worsening or concerns.                        Clinical Impression:   Final  diagnoses:  [M79.606] Leg pain  [W19.XXXA] Fall, initial encounter (Primary)        ED Disposition Condition    Discharge Stable          ED Prescriptions    None       Follow-up Information       Follow up With Specialties Details Why Contact Info    [facility]   As needed [address] [facility] [address] [place] [id number]  [phone]      [facility]. Emergency Medicine  If symptoms worsen [address] [facility] [address] [phone]             Javier Snider NP  03/04/23 7820

## 2023-03-17 ENCOUNTER — PATIENT MESSAGE (OUTPATIENT)
Dept: RESEARCH | Facility: HOSPITAL | Age: 33
End: 2023-03-17
Payer: MEDICAID

## 2023-03-23 ENCOUNTER — TELEPHONE (OUTPATIENT)
Dept: ORTHOPEDICS | Facility: CLINIC | Age: 33
End: 2023-03-23
Payer: MEDICAID

## 2023-03-23 DIAGNOSIS — M25.572 ACUTE LEFT ANKLE PAIN: Primary | ICD-10-CM

## 2023-03-23 NOTE — TELEPHONE ENCOUNTER
Called and informed pt Brody OK for him to come in for 3:30 pm today. Pt verbally understood and was satisfied.

## 2023-04-17 ENCOUNTER — TELEPHONE (OUTPATIENT)
Dept: ORTHOPEDICS | Facility: CLINIC | Age: 33
End: 2023-04-17
Payer: MEDICAID

## 2023-04-17 DIAGNOSIS — M25.572 ACUTE LEFT ANKLE PAIN: Primary | ICD-10-CM

## 2023-04-17 NOTE — TELEPHONE ENCOUNTER
Called and rescheduled  pt's x-rays and virtual appt with Brody in four weeks as pt requested. Scheduled pt's x-rays on 05/15 @ 1:30 pm. Scheduled pt's virtual appt with Brody on 05/17 @ 4 pm. Pt was satisfied with new appt/ x-rays dates/times.

## 2023-05-08 ENCOUNTER — OFFICE VISIT (OUTPATIENT)
Dept: URGENT CARE | Facility: CLINIC | Age: 33
End: 2023-05-08
Payer: MEDICAID

## 2023-05-08 ENCOUNTER — HOSPITAL ENCOUNTER (OUTPATIENT)
Dept: RADIOLOGY | Facility: CLINIC | Age: 33
Discharge: HOME | End: 2023-05-08
Attending: PHYSICIAN ASSISTANT
Payer: MEDICAID

## 2023-05-08 VITALS
OXYGEN SATURATION: 96 % | DIASTOLIC BLOOD PRESSURE: 81 MMHG | TEMPERATURE: 98 F | RESPIRATION RATE: 17 BRPM | HEART RATE: 98 BPM | BODY MASS INDEX: 28.79 KG/M2 | SYSTOLIC BLOOD PRESSURE: 137 MMHG | WEIGHT: 190 LBS | HEIGHT: 68 IN

## 2023-05-08 DIAGNOSIS — J20.9 ACUTE BRONCHITIS, UNSPECIFIED ORGANISM: Primary | ICD-10-CM

## 2023-05-08 DIAGNOSIS — J01.40 ACUTE NON-RECURRENT PANSINUSITIS: ICD-10-CM

## 2023-05-08 DIAGNOSIS — J20.9 ACUTE BRONCHITIS, UNSPECIFIED ORGANISM: ICD-10-CM

## 2023-05-08 PROCEDURE — 71046 XR CHEST PA AND LATERAL: ICD-10-PCS | Mod: S$GLB,,, | Performed by: STUDENT IN AN ORGANIZED HEALTH CARE EDUCATION/TRAINING PROGRAM

## 2023-05-08 PROCEDURE — 99204 OFFICE O/P NEW MOD 45 MIN: CPT | Mod: 25,S$GLB,, | Performed by: PHYSICIAN ASSISTANT

## 2023-05-08 PROCEDURE — 99204 PR OFFICE/OUTPT VISIT, NEW, LEVL IV, 45-59 MIN: ICD-10-PCS | Mod: 25,S$GLB,, | Performed by: PHYSICIAN ASSISTANT

## 2023-05-08 PROCEDURE — 71046 X-RAY EXAM CHEST 2 VIEWS: CPT | Mod: S$GLB,,, | Performed by: STUDENT IN AN ORGANIZED HEALTH CARE EDUCATION/TRAINING PROGRAM

## 2023-05-08 RX ORDER — PROMETHAZINE HYDROCHLORIDE AND DEXTROMETHORPHAN HYDROBROMIDE 6.25; 15 MG/5ML; MG/5ML
5 SYRUP ORAL EVERY 6 HOURS PRN
Qty: 180 ML | Refills: 0 | Status: SHIPPED | OUTPATIENT
Start: 2023-05-08 | End: 2023-05-17

## 2023-05-08 RX ORDER — DEXAMETHASONE SODIUM PHOSPHATE 100 MG/10ML
8 INJECTION INTRAMUSCULAR; INTRAVENOUS
Status: COMPLETED | OUTPATIENT
Start: 2023-05-08 | End: 2023-05-08

## 2023-05-08 RX ORDER — PREDNISONE 20 MG/1
TABLET ORAL
Qty: 12 TABLET | Refills: 0 | Status: SHIPPED | OUTPATIENT
Start: 2023-05-08 | End: 2023-05-14

## 2023-05-08 RX ORDER — ALBUTEROL SULFATE 90 UG/1
1-2 AEROSOL, METERED RESPIRATORY (INHALATION) EVERY 6 HOURS PRN
Qty: 8 G | Refills: 0 | Status: SHIPPED | OUTPATIENT
Start: 2023-05-08 | End: 2023-06-07

## 2023-05-08 RX ORDER — AMOXICILLIN AND CLAVULANATE POTASSIUM 875; 125 MG/1; MG/1
1 TABLET, FILM COATED ORAL EVERY 12 HOURS
Qty: 20 TABLET | Refills: 0 | Status: SHIPPED | OUTPATIENT
Start: 2023-05-08 | End: 2023-05-18

## 2023-05-08 RX ADMIN — DEXAMETHASONE SODIUM PHOSPHATE 8 MG: 100 INJECTION INTRAMUSCULAR; INTRAVENOUS at 02:05

## 2023-05-08 NOTE — PROGRESS NOTES
"Subjective:      Patient ID: Hudson Alex is a 32 y.o. male.    Vitals:  height is 5' 8" (1.727 m) and weight is 86.2 kg (190 lb). His oral temperature is 98.3 °F (36.8 °C). His blood pressure is 137/81 and his pulse is 98. His respiration is 17 and oxygen saturation is 96%.     Chief Complaint: Sinus Problem    Patient is here today complaining of a sore throat, coughing up yellowish mucus, and headache. Patient also stated that he has been sick now for weeks. He deals with possible allergies this time of year - nasal congestion and nasal drip x 4 months. But the cough has worsened. He has been taken OTC medication; mild relief.    Sinus Problem  This is a new problem. The current episode started 1 to 4 weeks ago. The problem has been gradually worsening since onset. There has been no fever. His pain is at a severity of 5/10. The pain is mild. Associated symptoms include congestion, coughing, headaches, sinus pressure and a sore throat. Pertinent negatives include no chills, diaphoresis, ear pain, hoarse voice, neck pain, shortness of breath, sneezing or swollen glands. Past treatments include saline nose sprays and nasal decongestants. The treatment provided mild relief.     Constitution: Positive for fatigue. Negative for chills, sweating and fever.   HENT:  Positive for congestion, postnasal drip, sinus pressure and sore throat. Negative for ear pain.    Neck: Negative for neck pain.   Eyes:  Negative for eye itching, eye pain and eye redness.   Respiratory:  Positive for cough, sputum production and wheezing. Negative for shortness of breath.    Gastrointestinal:  Negative for abdominal pain, nausea, vomiting and diarrhea.   Genitourinary:  Negative for dysuria, frequency, urgency and flank pain.   Allergic/Immunologic: Negative for sneezing.   Neurological:  Positive for headaches.    Objective:     Physical Exam   Constitutional: He is oriented to person, place, and time. He appears well-developed. " He is cooperative.  Non-toxic appearance. He does not appear ill. No distress.   HENT:   Head: Normocephalic and atraumatic.   Ears:   Right Ear: Hearing, tympanic membrane, external ear and ear canal normal.   Left Ear: Hearing, tympanic membrane, external ear and ear canal normal.   Nose: Mucosal edema and congestion present. No rhinorrhea or nasal deformity. No epistaxis. Right sinus exhibits maxillary sinus tenderness and frontal sinus tenderness. Left sinus exhibits maxillary sinus tenderness and frontal sinus tenderness.   Mouth/Throat: Uvula is midline, oropharynx is clear and moist and mucous membranes are normal. No trismus in the jaw. Normal dentition. No uvula swelling. No oropharyngeal exudate, posterior oropharyngeal edema or posterior oropharyngeal erythema.   Eyes: Conjunctivae and lids are normal. No scleral icterus.   Neck: Trachea normal and phonation normal. Neck supple. No edema present. No erythema present. No neck rigidity present.   Cardiovascular: Normal rate, regular rhythm, normal heart sounds and normal pulses.   Pulmonary/Chest: Effort normal. No respiratory distress. He has no decreased breath sounds. He has wheezes (bilateral mid and lower lung fields mod exp wheeze). He has no rhonchi.   Abdominal: Normal appearance.   Musculoskeletal: Normal range of motion.         General: No deformity. Normal range of motion.   Neurological: He is alert and oriented to person, place, and time. He exhibits normal muscle tone. Coordination normal.   Skin: Skin is warm, dry, intact, not diaphoretic and not pale.   Psychiatric: His speech is normal and behavior is normal. Judgment and thought content normal.   Nursing note and vitals reviewed.      X-Ray Chest PA And Lateral    Result Date: 5/8/2023  EXAM: XR CHEST PA AND LATERAL CLINICAL HISTORY:  [J20.9]-Acute bronchitis, unspecified. TECHNIQUE: PA and lateral views of the chest. COMPARISON: None available. FINDINGS:  Cardiomediastinal silhouette is  within normal limits. Trachea is midline. Pulmonary vasculature is normal. Lungs are clear. No significant pleural effusion or pneumothorax. No acute bony abnormality.     No acute cardiopulmonary abnormality. Finalized on: 5/8/2023 2:08 PM By:  Hernando Guzman MD [facility]# [id number]      2023-05-08 14:10:56.755    [facility]     Assessment:     1. Acute bronchitis, unspecified organism    2. Acute non-recurrent pansinusitis        Plan:     Pt requested IM steroid. Risks and side effects discussed, as well as option to take po steroids with similar efficacy. Pt elects to receive.     Acute bronchitis, unspecified organism  -     X-Ray Chest PA And Lateral; Future; Expected date: 05/08/2023  -     dexAMETHasone injection 8 mg  -     predniSONE (DELTASONE) 20 MG tablet; Take 1 tablet (20 mg total) by mouth 3 (three) times daily for 2 days, THEN 1 tablet (20 mg total) 2 (two) times daily for 2 days, THEN 1 tablet (20 mg total) once daily for 2 days.  Dispense: 12 tablet; Refill: 0  -     promethazine-dextromethorphan (PROMETHAZINE-DM) 6.25-15 mg/5 mL Syrp; Take 5 mLs by mouth every 6 (six) hours as needed (cough).  Dispense: 180 mL; Refill: 0  -     albuterol (VENTOLIN HFA) 90 mcg/actuation inhaler; Inhale 1-2 puffs into the lungs every 6 (six) hours as needed for Wheezing or Shortness of Breath. Rescue  Dispense: 8 g; Refill: 0    Acute non-recurrent pansinusitis  -     amoxicillin-clavulanate 875-125mg (AUGMENTIN) 875-125 mg per tablet; Take 1 tablet by mouth every 12 (twelve) hours. for 10 days  Dispense: 20 tablet; Refill: 0    Mary Jimenez PA-C  [facility]       Patient Instructions   Take augmentin antibiotics for sinus infection. You can take promethazine DM cough syrup every 4-6 hours (but do not combine with over the counter cough syrup). Rest and drink plenty of fluids.Tylenol or motrin for fever, sore throat or body aches. MUCINEX to help break up congestion. Albuterol inhaler 2-4 puffs every 4-6  hours as needed for wheeze.     You were given steroid injection here. You may start taking prednisone Thursday. Discontinue if  severe side effects - may cause anxiety, palpitations and fluid retention.      You need to be re-evaluated if symptoms do not improve after treatment or if you start running new fever. You need urgent re-evaluation for any chest pain, wheezing or shortness of breath not relief by inhaler.

## 2023-05-08 NOTE — PATIENT INSTRUCTIONS
Take augmentin antibiotics for sinus infection. You can take promethazine DM cough syrup every 4-6 hours (but do not combine with over the counter cough syrup). Rest and drink plenty of fluids.Tylenol or motrin for fever, sore throat or body aches. MUCINEX to help break up congestion. Albuterol inhaler 2-4 puffs every 4-6 hours as needed for wheeze.     You were given steroid injection here. You may start taking prednisone Thursday. Discontinue if  severe side effects - may cause anxiety, palpitations and fluid retention.      You need to be re-evaluated if symptoms do not improve after treatment or if you start running new fever. You need urgent re-evaluation for any chest pain, wheezing or shortness of breath not relief by inhaler.

## 2023-05-08 NOTE — LETTER
May 8, 2023      [facility]  [address]  [place] [address]  Phone: [phone]       Patient: Hudson Alex   YOB: 1990  Date of Visit: 05/08/2023    To Whom It May Concern:    Cynthia Alex  was at [facility] on 05/08/2023. The patient may return to work/school on 05/9/23 with no restrictions. If you have any questions or concerns, or if I can be of further assistance, please do not hesitate to contact me.    Sincerely,    NEDRA Nolen PA-C  [facility]

## 2023-05-11 ENCOUNTER — TELEPHONE (OUTPATIENT)
Dept: URGENT CARE | Facility: CLINIC | Age: 33
End: 2023-05-11
Payer: MEDICAID

## 2024-02-11 ENCOUNTER — OFFICE VISIT (OUTPATIENT)
Dept: URGENT CARE | Facility: CLINIC | Age: 34
End: 2024-02-11
Payer: MEDICAID

## 2024-02-11 VITALS
BODY MASS INDEX: 27.28 KG/M2 | HEART RATE: 81 BPM | RESPIRATION RATE: 14 BRPM | TEMPERATURE: 98 F | DIASTOLIC BLOOD PRESSURE: 90 MMHG | OXYGEN SATURATION: 99 % | SYSTOLIC BLOOD PRESSURE: 133 MMHG | WEIGHT: 180 LBS | HEIGHT: 68 IN

## 2024-02-11 DIAGNOSIS — S09.301A INJURY OF TYMPANIC MEMBRANE OF RIGHT EAR, INITIAL ENCOUNTER: ICD-10-CM

## 2024-02-11 DIAGNOSIS — M25.50 ARTHRALGIA, UNSPECIFIED JOINT: ICD-10-CM

## 2024-02-11 DIAGNOSIS — J06.9 VIRAL URI: Primary | ICD-10-CM

## 2024-02-11 DIAGNOSIS — S09.90XA CLOSED HEAD INJURY, INITIAL ENCOUNTER: ICD-10-CM

## 2024-02-11 PROBLEM — F13.20 BENZODIAZEPINE DEPENDENCE: Status: ACTIVE | Noted: 2020-06-02

## 2024-02-11 PROBLEM — F11.20 HEROIN DEPENDENCE: Status: ACTIVE | Noted: 2020-06-02

## 2024-02-11 PROCEDURE — 99213 OFFICE O/P EST LOW 20 MIN: CPT | Mod: S$GLB,,, | Performed by: EMERGENCY MEDICINE

## 2024-02-11 RX ORDER — HYDROXYZINE PAMOATE 25 MG/1
25-50 CAPSULE ORAL NIGHTLY PRN
COMMUNITY
Start: 2024-01-26

## 2024-02-11 RX ORDER — FLUTICASONE PROPIONATE 50 MCG
SPRAY, SUSPENSION (ML) NASAL
COMMUNITY
Start: 2023-10-15

## 2024-02-11 RX ORDER — FINASTERIDE 5 MG/1
5 TABLET, FILM COATED ORAL EVERY MORNING
COMMUNITY

## 2024-02-11 RX ORDER — DEXTROAMPHETAMINE SACCHARATE, AMPHETAMINE ASPARTATE, DEXTROAMPHETAMINE SULFATE AND AMPHETAMINE SULFATE 3.75; 3.75; 3.75; 3.75 MG/1; MG/1; MG/1; MG/1
15 TABLET ORAL EVERY MORNING
COMMUNITY

## 2024-02-11 RX ORDER — PREGABALIN 100 MG/1
100 CAPSULE ORAL
COMMUNITY
Start: 2024-02-02

## 2024-02-11 RX ORDER — BUPRENORPHINE HYDROCHLORIDE, NALOXONE HYDROCHLORIDE 8; 2 MG/1; MG/1
FILM, SOLUBLE BUCCAL; SUBLINGUAL 3 TIMES DAILY
COMMUNITY

## 2024-02-11 NOTE — LETTER
February 11, 2024      [facility]  [id number] [place] [address] [address]  [facility] [address]  Phone: [phone]       Patient: Hudson Alex   YOB: 1990  Date of Visit: 02/11/2024    To Whom It May Concern:    Cynthia Alex  was at [facility] on 02/11/2024. The patient may return to work/school on 02/12/2024 with no restrictions. If you have any questions or concerns, or if I can be of further assistance, please do not hesitate to contact me.    Sincerely,    Sheri Alejo MD

## 2024-02-11 NOTE — PATIENT INSTRUCTIONS
Use over the counter(OTC) antihistamine like claritin or zyrtec daily.  Flonase: 2 sprays per nostril 1-2 times a day.   Nasal saline drops: 2-4 drops per nostril 10-15 times a day.     Tylenol or Motrin for fever or pain per label instructions.  Consider use of OTC cough medicine like Robitussin DM.  Warm saltwater gargles to 3 times a day.    Rest and drink plenty of fluids.  Avoid OTC decongestants if you have high blood pressure. This includes multi-cold symptom preparations.    Consider permissive fever to allow your immune system to work.  However, if fever is not tolerable or is disrupting sleep, use Tylenol or Motrin per label instructions.    You are considered contagious until your systemic, or whole body, symptoms have resolved fully for 24 hours.  This includes fever, body aches, fatigue.    Follow up in 2-3 days with PCP if no improvement or any worsening.       Viral Upper Respiratory Infection Discharge Instructions, Adult   About this topic   You have an upper respiratory infection or URI. A URI can affect your nose, throat, ears, and sinuses. A virus is the cause of almost all URIs and antibiotics will not help you feel better more quickly. The common cold is an example of a viral URI.  URIs are easy to spread from person to person, most often through coughing or sneezing. A URI will almost always get better in a week or two without any treatment.         What care is needed at home?   Ask your doctor what you need to do when you go home. Make sure you ask questions if you do not understand what the doctor says.  If you smoke, try to quit. Your doctor or nurse can help.  Drink lots of fluids like water, juice, or broth. This will help replace any fluids lost if you have a runny nose or fever. Warm tea or soup can help soothe a sore throat.  If the air in your home feels dry, use a cool mist humidifier. This can help a stuffy nose and make it easier to breathe.  You can also use saline nose drops to  relieve stuffiness.  If you decide to take over-the-counter cough or cold medicines, follow the directions on the label carefully. Be sure you do not take more than 1 medicine that contains acetaminophen. Also, if you have a heart problem or high blood pressure, check with your doctor before you take any of these medicines.  Wash your hands often. Cough or sneeze into a tissue or your elbow instead of your hands. This will help keep others healthy.  What follow-up care is needed?   Your doctor may ask you to make visits to the office to check on your progress. Be sure to keep these visits.  What drugs may be needed?   The doctor may order drugs to:  Open up the tubes of your lungs  Treat viral infection  Relieve or stop coughing  Help with pain from a sore throat  Relieve runny and stuffy nose  Provide oxygen  Will physical activity be limited?   You need to rest for a few days to let your body recover from the infection.  What changes to diet are needed?   Eat soft foods like soup if swallowing is too painful.  What problems could happen?   Asthma attack  Sinus infections  Lung problems like pneumonia and bronchitis  Severe fluid loss. This is dehydration.  What can be done to prevent this health problem?   Wash your hands often with soap and water for at least 20 seconds, especially after coughing or sneezing. Alcohol-based hand sanitizers also work to kill the virus.  If you are sick, cover your mouth and nose with tissue when you cough or sneeze. You can also cough into your elbow. Throw away tissues in the trash and wash your hands after touching used tissues.  Do not get too close (kissing, hugging) to people who are sick.  Do not share towels or hankies with anyone who is sick. Clean commonly handled things like door handles, remotes, toys, and phones. Wipe them with a disinfectant.  Stay away from crowded places.  Cover your nose and mouth when you sneeze or cough.  Take vitamin C to help build up your  body's ability to fight disease.  Get a flu shot each year.  When do I need to call the doctor?   You have trouble breathing when talking or sitting still.  You have a fever of 100.4°F (38°C) or higher for several days, chills, a very bad sore throat, or ear or sinus pain.  You develop a new fever after several days of feeling the same or improving.  You develop chest pain when you cough.  You have a cough that lasts more than 10 days.  You cough up blood, or the color of the mucus you cough up changes.  Teach Back: Helping You Understand   The Teach Back Method helps you understand the information we are giving you. After you talk with the staff, tell them in your own words what you learned. This helps to make sure the staff has described each thing clearly. It also helps to explain things that may have been confusing. Before going home, make sure you can do these:  I can tell you about my condition.  I can tell you what may help ease my signs.  I can tell you what I will do if I have a fever, chills, breathing very fast, or trouble breathing.  Where can I learn more?   American Lung Association  https://www.lung.org/blog/can-you-exercise-with-a-cold   American Lung Association  https://www.lung.org/lung-health-diseases/lung-disease-lookup/influenza/facts-about-the-common-cold   NHS Choices  https://www.nhs.uk/conditions/respiratory-tract-infection/   UpToDate  https://www.digedudate.com/contents/the-common-cold-in-adults-beyond-the-basics   Last Reviewed Date   2021-06-08  Consumer Information Use and Disclaimer   This information is not specific medical advice and does not replace information you receive from your health care provider. This is only a brief summary of general information. It does NOT include all information about conditions, illnesses, injuries, tests, procedures, treatments, therapies, discharge instructions or life-style choices that may apply to you. You must talk with your health care provider  for complete information about your health and treatment options. This information should not be used to decide whether or not to accept your health care providers advice, instructions or recommendations. Only your health care provider has the knowledge and training to provide advice that is right for you.  Copyright   Copyright © 2021 [employer] and its affiliates and/or licensors. All rights reserved.

## 2024-02-11 NOTE — PROGRESS NOTES
"Subjective:      Patient ID: Hudson Alex is a 33 y.o. male.    Vitals:  height is 5' 8" (1.727 m) and weight is 81.6 kg (180 lb). His tympanic temperature is 97.9 °F (36.6 °C). His blood pressure is 133/90 (abnormal) and his pulse is 81. His respiration is 14 and oxygen saturation is 99%.     Chief Complaint: Ear Drainage    34 y/o male presents to clinic with c/o right ear bloody discharge, headaches, and fatigue for 5 days.  Has had some upper respiratory symptoms as well with congestion and runny nose.  Patient states he was cleaning his ears with a q-tip 5 days ago.  Also had a slip and fall and hit his head on Tuesday.  Patient reports that he did not have any loss of consciousness at the time..Patient states he also has been really fatigued and is requesting a note for work.  States he has had concussions before then this feels somewhat the same except that his headache is in a different spot.  In the past he has hit the back of his head.  This fall he hit the front of his head.  Patient states he has been having joint swelling and has a remote history of some arthritis like illness. Patient states no medication to treat any symptoms.  Did have 1 episode of nausea with 1 episode of vomiting about 3 days ago.  None now or since.    Ear Drainage   There is pain in the right ear. This is a new problem. The current episode started in the past 7 days. There has been no fever. The pain is at a severity of 5/10. The pain is mild. Associated symptoms include ear discharge and headaches. Pertinent negatives include no abdominal pain, coughing, diarrhea, hearing loss, neck pain, rash, rhinorrhea, sore throat or vomiting. He has tried nothing for the symptoms. There is no history of a chronic ear infection, hearing loss or a tympanostomy tube.       Constitution: Positive for activity change and fatigue. Negative for fever.   HENT:  Positive for ear discharge, congestion and postnasal drip. Negative for " hearing loss and sore throat.    Neck: Negative for neck pain.   Respiratory:  Negative for cough.    Gastrointestinal:  Negative for abdominal pain, vomiting and diarrhea.   Skin:  Negative for rash.   Neurological:  Positive for headaches.      Objective:     Physical Exam   Constitutional: He is oriented to person, place, and time. He appears well-developed. He is cooperative.  Non-toxic appearance. He does not appear ill. No distress.   HENT:   Head: Normocephalic and atraumatic.      Comments: No sanchez sign or raccoon eyes.  No definite trauma observed on the head.  Ears:   Right Ear: Hearing and tympanic membrane normal.   Left Ear: Hearing, tympanic membrane and external ear normal.      Comments: No perforated TM.  There is dried blood in the canal posteriorly near the ear drum.  No active bleeding.  No hemotympanum bilaterally.  Nose: Congestion present. No mucosal edema, rhinorrhea or nasal deformity. No epistaxis. Right sinus exhibits no maxillary sinus tenderness and no frontal sinus tenderness. Left sinus exhibits no maxillary sinus tenderness and no frontal sinus tenderness.   Mouth/Throat: Uvula is midline and mucous membranes are normal. Mucous membranes are moist. No trismus in the jaw. Normal dentition. No uvula swelling. Posterior oropharyngeal erythema present. No oropharyngeal exudate or posterior oropharyngeal edema.   Eyes: Conjunctivae and lids are normal. No scleral icterus.   Neck: Trachea normal and phonation normal. Neck supple.      Comments: No C-spine tenderness to palpation. No edema present. No erythema present. No neck rigidity present.   Cardiovascular: Normal rate, regular rhythm, normal heart sounds and normal pulses.   Pulmonary/Chest: Effort normal and breath sounds normal. No respiratory distress. He has no decreased breath sounds. He has no rhonchi.   Abdominal: Normal appearance.   Musculoskeletal: Normal range of motion.         General: No deformity. Normal range of  motion.   Neurological: no focal deficit. He is alert and oriented to person, place, and time. He exhibits normal muscle tone. Coordination normal.   Skin: Skin is warm, dry, intact, not diaphoretic and not pale.   Psychiatric: His speech is normal and behavior is normal. Judgment and thought content normal.   Nursing note and vitals reviewed.      Assessment:     1. Viral URI    2. Arthralgia, unspecified joint    3. Closed head injury, initial encounter    4. Injury of tympanic membrane of right ear, initial encounter        Plan:       Viral URI    Arthralgia, unspecified joint    Closed head injury, initial encounter    Injury of tympanic membrane of right ear, initial encounter          Medical Decision Making:   History:   Old Records Summarized: records from clinic visits.  Initial Assessment:   Closed head injury, headache, blood in ear canal  Differential Diagnosis:   Hemotympanum, perforated TM, contusion, concussion, viral URI  Urgent Care Management:  Patient appears to have a viral URI which may explain some of his frontal headache.  No definite contusion noted over the forehead which was the site of impact.  No sanchez sign, raccoon eyes, or hemotympanum.  He has dried blood in the canal, presumably from the Q-tip trauma.  Advised no use of Q-tips going forward.  Patient given concussion precautions.  Reporting swollen joints.  This is unclear as to the source.  There is no obvious swelling here today.  Patient advised to follow this up with his PCP who apparently initiated a workup for this in the past.  Reviewed use of antihistamines, nasal steroid spray, saline drops as well as rest and hydration to deal with viral symptoms.  Patient verbalizes understanding of and agreement with this plan.

## 2025-03-25 ENCOUNTER — NURSE TRIAGE (OUTPATIENT)
Dept: ADMINISTRATIVE | Facility: CLINIC | Age: 35
End: 2025-03-25
Payer: MEDICAID

## 2025-03-25 NOTE — TELEPHONE ENCOUNTER
Spoke with patient who states he smashed his right pinkie finger in [place] [place] 2 days in a row last week.  Patient was evaluated at [facility] ED x 3.  Patient states he was told he needs to see a Orthopedic hand surgeon.  Patient states a representative from [facility] informed him that since his services were not rendered at an [facility] facility he would not be able to get a appointment with the referral.  Patient was triaged he reports having severe pain in the wound.  Advised ED for evaluation.  Patient states he wants to know if he goes to an [facility] ED will he get a referral to see a orthopedic hand surgeon.  Dispo was reiterated.  Patient states he has to speak with his wife about going to [facility] ED in [place].  Patient was made aware that only the ED provider could initiate another referral if they felt it was needed during his visit.  Patient verbalized understanding.   Reason for Disposition   SEVERE pain in the wound    Additional Information   Negative: [1] Widespread rash AND [2] bright red, sunburn-like AND [3] too weak to stand   Negative: Sounds like a life-threatening emergency to the triager    Protocols used: Wound Infection [id number]

## (undated) DEVICE — PAD CAST SPECIALIST STRL 6

## (undated) DEVICE — DRAPE ORTH SPLIT 77X108IN

## (undated) DEVICE — GUIDE DRILL AO 2.6X30MM

## (undated) DEVICE — BIT DRILL OVERDRILL AO 2.7MM

## (undated) DEVICE — SUT VICRYL PLUS 2-0 CT1 18

## (undated) DEVICE — DRAPE C-ARM ELAS CLIP 42X120IN

## (undated) DEVICE — Device

## (undated) DEVICE — TOURNIQUET SB QC DP 34X4IN

## (undated) DEVICE — DRESSING AQUACEL RIBBON 2X45CM

## (undated) DEVICE — APPLICATOR CHLORAPREP ORN 26ML

## (undated) DEVICE — BOWL STERILE LARGE 32OZ

## (undated) DEVICE — DRESSING GAUZE XEROFORM 5X9

## (undated) DEVICE — DRAPE THREE-QTR REINF 53X77IN

## (undated) DEVICE — SPONGE GAUZE 16PLY 4X4

## (undated) DEVICE — SUT VICRYL PLUS 3-0 SH 18IN

## (undated) DEVICE — DRAPE THREE-QUARTER 53X77IN

## (undated) DEVICE — TIP YANKAUERS BULB NO VENT

## (undated) DEVICE — DRAPE C-ARMOR EQUIPMENT COVER

## (undated) DEVICE — DRAPE TOP 53X102IN

## (undated) DEVICE — GAUZE SPONGE 4X4 12PLY

## (undated) DEVICE — BIT DRILL AO 2X135MM SCALED

## (undated) DEVICE — CATH SUCTION 10FR

## (undated) DEVICE — DRAPE STERI U-SHAPED 47X51IN

## (undated) DEVICE — TRAY MINOR ORTHO OMC

## (undated) DEVICE — STOCKINET 4INX48

## (undated) DEVICE — PADDING WYTEX UNDRCST 6INX4YD

## (undated) DEVICE — SUT ETHILON 3/0 18IN PS-1

## (undated) DEVICE — DRAPE U SPLIT SHEET 54X76IN

## (undated) DEVICE — MASK FLYTE HOOD PEEL AWAY

## (undated) DEVICE — COUNTERSINK CANN ASNIS III 4MM

## (undated) DEVICE — BANDAGE ACE ELASTIC 6"

## (undated) DEVICE — BLADE #15 STERILE CARBON

## (undated) DEVICE — TAPE SURG DURAPORE 2 X10YD

## (undated) DEVICE — BANDAGE ESMARK 6X12

## (undated) DEVICE — BANDAGE ELAS SOFTWRAP ST 6X5YD

## (undated) DEVICE — ELECTRODE REM PLYHSV RETURN 9

## (undated) DEVICE — TOWEL OR DISP STRL BLUE 4/PK

## (undated) DEVICE — BNDG COFLEX FOAM LF2 ST 4X5YD